# Patient Record
Sex: FEMALE | Race: WHITE | NOT HISPANIC OR LATINO | URBAN - METROPOLITAN AREA
[De-identification: names, ages, dates, MRNs, and addresses within clinical notes are randomized per-mention and may not be internally consistent; named-entity substitution may affect disease eponyms.]

---

## 2017-08-21 ENCOUNTER — COMMUNICATION - HEALTHEAST (OUTPATIENT)
Dept: INTERNAL MEDICINE | Facility: CLINIC | Age: 42
End: 2017-08-21

## 2017-08-28 ENCOUNTER — OFFICE VISIT - HEALTHEAST (OUTPATIENT)
Dept: INTERNAL MEDICINE | Facility: CLINIC | Age: 42
End: 2017-08-28

## 2017-08-28 DIAGNOSIS — Z79.899 ENCOUNTER FOR LONG-TERM (CURRENT) USE OF OTHER MEDICATIONS: ICD-10-CM

## 2017-08-28 DIAGNOSIS — D64.9 ANEMIA: ICD-10-CM

## 2017-08-28 DIAGNOSIS — R87.619 ABNORMAL PAP SMEAR OF CERVIX: ICD-10-CM

## 2017-08-28 DIAGNOSIS — H65.90 SEROUS OTITIS MEDIA: ICD-10-CM

## 2017-08-28 DIAGNOSIS — Z13.220 LIPID SCREENING: ICD-10-CM

## 2017-08-28 DIAGNOSIS — I10 ESSENTIAL HYPERTENSION: ICD-10-CM

## 2017-08-28 LAB
CHOLEST SERPL-MCNC: 163 MG/DL
FASTING STATUS PATIENT QL REPORTED: NO
HDLC SERPL-MCNC: 24 MG/DL
LDLC SERPL CALC-MCNC: 60 MG/DL
LDLC SERPL CALC-MCNC: ABNORMAL MG/DL
TRIGL SERPL-MCNC: 425 MG/DL

## 2017-08-28 ASSESSMENT — MIFFLIN-ST. JEOR: SCORE: 1378.64

## 2017-08-29 ENCOUNTER — COMMUNICATION - HEALTHEAST (OUTPATIENT)
Dept: INTERNAL MEDICINE | Facility: CLINIC | Age: 42
End: 2017-08-29

## 2017-08-29 DIAGNOSIS — D50.9 IRON DEFICIENCY ANEMIA: ICD-10-CM

## 2018-05-09 ENCOUNTER — COMMUNICATION - HEALTHEAST (OUTPATIENT)
Dept: INTERNAL MEDICINE | Facility: CLINIC | Age: 43
End: 2018-05-09

## 2018-11-12 ENCOUNTER — COMMUNICATION - HEALTHEAST (OUTPATIENT)
Dept: INTERNAL MEDICINE | Facility: CLINIC | Age: 43
End: 2018-11-12

## 2018-11-15 ENCOUNTER — OFFICE VISIT - HEALTHEAST (OUTPATIENT)
Dept: INTERNAL MEDICINE | Facility: CLINIC | Age: 43
End: 2018-11-15

## 2018-11-15 DIAGNOSIS — I10 BENIGN ESSENTIAL HYPERTENSION: ICD-10-CM

## 2018-11-15 DIAGNOSIS — D50.8 IRON DEFICIENCY ANEMIA SECONDARY TO INADEQUATE DIETARY IRON INTAKE: ICD-10-CM

## 2018-11-15 RX ORDER — METOPROLOL SUCCINATE 100 MG/1
100 TABLET, EXTENDED RELEASE ORAL DAILY
Qty: 90 TABLET | Refills: 3 | Status: SHIPPED | OUTPATIENT
Start: 2018-11-15 | End: 2022-03-30 | Stop reason: DRUGHIGH

## 2018-11-15 RX ORDER — FERROUS SULFATE 325(65) MG
1 TABLET ORAL 2 TIMES DAILY
Qty: 60 TABLET | Refills: 3 | Status: SHIPPED | OUTPATIENT
Start: 2018-11-15

## 2018-11-15 ASSESSMENT — MIFFLIN-ST. JEOR: SCORE: 1368.2

## 2020-04-15 ENCOUNTER — COMMUNICATION - HEALTHEAST (OUTPATIENT)
Dept: SCHEDULING | Facility: CLINIC | Age: 45
End: 2020-04-15

## 2020-04-15 ENCOUNTER — OFFICE VISIT - HEALTHEAST (OUTPATIENT)
Dept: INTERNAL MEDICINE | Facility: CLINIC | Age: 45
End: 2020-04-15

## 2020-04-15 DIAGNOSIS — I10 BENIGN ESSENTIAL HYPERTENSION: ICD-10-CM

## 2020-04-15 RX ORDER — METOPROLOL SUCCINATE 100 MG/1
100 TABLET, EXTENDED RELEASE ORAL DAILY
Qty: 30 TABLET | Refills: 11 | Status: SHIPPED | OUTPATIENT
Start: 2020-04-15 | End: 2022-03-30

## 2021-05-27 VITALS — DIASTOLIC BLOOD PRESSURE: 92 MMHG | SYSTOLIC BLOOD PRESSURE: 160 MMHG

## 2021-05-31 VITALS — HEIGHT: 66 IN | WEIGHT: 162 LBS | BODY MASS INDEX: 26.03 KG/M2

## 2021-06-02 VITALS — WEIGHT: 159.7 LBS | BODY MASS INDEX: 25.67 KG/M2 | HEIGHT: 66 IN

## 2021-06-07 NOTE — TELEPHONE ENCOUNTER
RN triage   Call from pt   Pt states saw CHIRO today -- BP = 160/92   Pt states she lost her health insurance and had not taken her BP med for about 1 year   Pt states she now has health insurance   No chest pain - no diff breathing - no slurred speech or blurred vision - no gait changes   Pt states she gets bad headache about 3 times / week -- and ' sees stars' sometimes w/ bad headaches - and sl lightheaded -   Has mild headache now   Usually takes Advil for headaches   No PCP of record  reviewed home care advice   Transferred to  for telephone appt     Zully Lucero RN [facility] RN triage          Reason for Disposition    Systolic BP >= 180 OR Diastolic >= 110, and missed most recent dose of blood pressure medication    Protocols used: HIGH BLOOD PRESSURE-A-[place]

## 2021-06-07 NOTE — PROGRESS NOTES
"Zeny Mantilla is a 45 y.o. female who is being evaluated via a billable telephone visit.      The patient has been notified of following:     \"This telephone visit will be conducted via a call between you and your physician/provider. We have found that certain health care needs can be provided without the need for a physical exam.  This service lets us provide the care you need with a short phone conversation.  If a prescription is necessary we can send it directly to your pharmacy.  If lab work is needed we can place an order for that and you can then stop by our lab to have the test done at a later time.    Telephone visits are billed at different rates depending on your insurance coverage. During this emergency period, for some insurers they may be billed the same as an in-person visit.  Please reach out to your insurance provider with any questions.    If during the course of the call the physician/provider feels a telephone visit is not appropriate, you will not be charged for this service.\"    Patient has given verbal consent to a Telephone visit? Yes    Patient would like to receive their AVS by AVS Preference: Christa.    Additional provider notes: 45-year-old woman with a history of hypertension.  She quit taking her medications after her insurance ran out no meds.  She is now back with insurance and her blood pressure is 162/92.  She would like to restart back on her medications.  Previously, she took metoprolol 100 mg daily without any side effects.  No diabetes and she is a non-smoker.    Assessment/Plan:  Hypertension currently not being treated.  This is because of lack of insurance.  She now has insurance so we will restart her on metoprolol succinate 100 mg p.o. daily.      Phone call duration:  5 minutes    Geno Crain CMA    "

## 2021-06-12 NOTE — PROGRESS NOTES
[facility] Follow Up Note  Assessment/Plan  1. Essential hypertension     2. Abnormal Pap smear of cervix     3. Lipid screening  Lipid Cascade   4. Encounter for long-term (current) use of other medications  Comprehensive Metabolic Panel    HM2(CBC w/o Differential)   5. Serous otitis media       6.  Anemia -    Patient Instructions   1. Medications were reviewed and medication refills completed if requested.   A corrected Medication List is listed below on this After Visit    Summary.   New Medications, Refilled medications or Discontinued medications are also listed below.      2. Immunizations were reviewed and updated as necessary.   All up to date  3. If labs were done today, they will either be mailed to you or released to My Chart online if that has been activated.  4. Increase metoprolol to 100 mg daily  5. We discussed today that the patient will need to get a new primary care provider after September 1, 2017.  Dr. Elver Carpio  6.  Anemia -  after the patient left her hemoglobin came back low and so we will do iron studies.  Presumably she has a risk of iron deficiency anemia due to menstrual blood loss.         MORE THAN 25 MINUTES WAS SPENT WITH THE PATIENT TODAY OF WHICH GREATER THAN 50% WAS SPENT IN COUNSELING AND COORDINATION OF CARE -  DISCUSSING THE AFOREMENTIONED DIAGNOSES, TREATMENT, AND PLAN.       Body mass index is 26.55 kg/(m^2).    Shantanu Perez MD  Internal Medicine [place] [address]  Voice: [phone]  Fax: [phone]    +++++++++++++++++++++++++++++++++++++++    Zeny Mantilla   42 y.o. female    Date of Visit: 8/28/2017    Chief Complaint   Patient presents with     Hypertension     Medication Management     Ear Problem     ,left     Subjective  1. Health Maintenance  - basic immunizations are up-to-date.  Pap smear is done elsewhere and is abnormal and is being followed.  I  believe that she had a colposcopy because of the abnormal Pap smear.    2.  Hypertension -patient  blood pressure medication today.  She gotten the metoprolol 25 mg twice daily filled by her gynecologist.  Blood pressure is substantially elevated today and so I do not think that is the correct dose.  I have not seen her for well over 2 years.  She is encouraged to follow-up for blood pressure as not only she might require a higher dose metoprolol but she might require multi drug therapy.  She drinks caffeine but does not drink alcohol and does not use street drugs.  She does not think that she is significantly stressed out at this time as she was when I met her in 2015 per    3.  Plugged left ear -patient is not acutely ill with respiratory infection but has had a plugged left ear on and off for 6 weeks.  In the past she had a history of plugged ear that required ear lavage but in fact he does not have evidence of cerumen impaction.  No fever or chills.  No history of allergies.    4.  Anemia -this diagnosis was made after she left based on basic laboratory studies that I ordered.  Going to try to add on iron studies and will need to determine if she needs iron supplements.    5.  Adjustment disorder with depression and anxiety -patient currently is not employed but she has her own home.  See my note of March 26, 2015 which references countless psychosocial issues that she had most of which are stable are under control now.    6.  Abnormal Pap smear -I emphasized extremely important that she continue to follow up with this with her gynecologist.  Her most recent visit was April 21, 2017 and I review those outside labs.      ROS A comprehensive review of systems was performed and was negative other than the problems noted above.    Medications, allergies, and problem list were reviewed and updated    No past medical history on file.  Past Surgical History:   Procedure Laterality Date     LAPAROSCOPIC ASSISSTED  TOTAL COLECTOMY W/ J-POUCH  1995     Social History     Social History     Marital status:      Spouse name: N/A     Number of children: N/A     Years of education: N/A     Occupational History     UNEMPLOYED      Social History Main Topics     Smoking status: Current Every Day Smoker     Packs/day: 1.00     Start date: 1/26/1992     Smokeless tobacco: Not on file     Alcohol use No     Drug use: No     Sexual activity: Not on file     Other Topics Concern     Not on file     Social History Narrative     No family history on file.    Current Outpatient Prescriptions   Medication Sig Dispense Refill     azithromycin (ZITHROMAX Z-JORDAN) 250 MG tablet Take 2 tablets (500 mg) on  Day 1,  followed by 1 tablet (250 mg) once daily on Days 2 through 5. 6 tablet 0     metoprolol succinate (TOPROL-XL) 100 MG 24 hr tablet Take 1 tablet (100 mg total) by mouth daily. 30 tablet 5     No current facility-administered medications for this visit.        Allergies   Allergen Reactions     Penicillins Anaphylaxis     Sulfa (Sulfonamide Antibiotics) Anaphylaxis     Codeine Other (See Comments)     Difficulty breathing      Morphine Other (See Comments)     Difficulty breathing      Percocet [Oxycodone-Acetaminophen] Other (See Comments)     Difficulty breathing      Vicodin [Hydrocodone-Acetaminophen] Other (See Comments)     Difficulty breathing        Exam  Vitals:    08/28/17 1332   BP: 150/90   Pulse: 84   Resp: 12     The patient is well-groomed and well-dressed alert and oriented ×3.   Head: Negative  HEENT: Normal external ear canals with no excess wax present.  Tympanic membranes are clear except the left tympanic membrane may be slightly dull but certainly no redness or bulging.  Lungs: Clear to auscultation without rales or wheezing.    Extremities: No peripheral edema.  Pulses are normal and symmetrical.  Neuro: Gait and mentation normal. Cranial Nerves intact.            Shantanu Perez MD

## 2021-06-16 PROBLEM — D50.9 IRON DEFICIENCY ANEMIA: Status: ACTIVE | Noted: 2017-08-29

## 2021-06-21 NOTE — PROGRESS NOTES
" ASSESSMENT AND PLAN:    1. Benign essential hypertension  Adequately controlled.     2. Iron deficiency anemia secondary to inadequate dietary iron intake  She has been taking iron replacement and will continue.  Doesn't want cbc or iron levels checked today.  Iron replacement gives her constipation.  Will use senakot two times a day.  Discussed fiber and adequate fluid intake.      CHIEF COMPLAINT:  Chief Complaint   Patient presents with     Follow-up     hypertension     Medication Management     refill     HISTORY OF PRESENT ILLNESS:  Zeny Mantilla is a 43 y.o. female her for medication refill and management.  Says she has quit smoking.  Overall doing well.  History of menorrhagia and iron deficiency.  Using an OTC iron supplement.  Gets constipation from iron.  No unsual dyspnea, or pain.      REVIEW OF SYSTEMS:   See HPI, all other systems on review are negative.    Active Ambulatory Problems     Diagnosis Date Noted     Iron deficiency anemia 08/29/2017     Resolved Ambulatory Problems     Diagnosis Date Noted     No Resolved Ambulatory Problems     No Additional Past Medical History     Past Surgical History:   Procedure Laterality Date     LAPAROSCOPIC ASSISSTED TOTAL COLECTOMY W/ J-POUCH  1995     VITALS:  Vitals:    11/15/18 1205 11/15/18 1212   BP: 140/84 140/86   Patient Site: Left Arm Left Arm   Patient Position: Sitting Sitting   Cuff Size: Adult Regular Adult Regular   Pulse: 87    SpO2: 98%    Weight: 159 lb 11.2 oz (72.4 kg)    Height: 5' 5.5\" (1.664 m)      Wt Readings from Last 3 Encounters:   11/15/18 159 lb 11.2 oz (72.4 kg)   08/28/17 162 lb (73.5 kg)   03/26/15 175 lb (79.4 kg)     PHYSICAL EXAM:  Constitutional:  Well appearing in NAD, alert and oriented  Neck:  Supple, no significant adenopathy.  Cardiac:  S1 S2 without murmur, rhythm regular  Lungs: Clear to auscultation, without wheezes or rales  Abdomen:   Soft, flat and non-tender, without guarding, rebound, or mass.      Current " Outpatient Medications   Medication Sig Dispense Refill     metoprolol succinate (TOPROL-XL) 100 MG 24 hr tablet Take 1 tablet (100 mg total) by mouth daily. 90 tablet 3     ferrous sulfate 325 (65 FE) MG tablet Take 1 tablet (325 mg total) by mouth 2 (two) times a day. 60 tablet 3     senna (SENOKOT) 8.6 mg tablet Take 1 tablet by mouth 2 (two) times a day. 60 tablet 6     No current facility-administered medications for this visit.      Neeraj Rivera MD  Internal Medicine  [facility]

## 2021-06-22 ENCOUNTER — COMMUNICATION - HEALTHEAST (OUTPATIENT)
Dept: INTERNAL MEDICINE | Facility: CLINIC | Age: 46
End: 2021-06-22

## 2021-06-22 DIAGNOSIS — I10 BENIGN ESSENTIAL HYPERTENSION: ICD-10-CM

## 2021-06-22 RX ORDER — METOPROLOL SUCCINATE 100 MG/1
100 TABLET, EXTENDED RELEASE ORAL DAILY
Qty: 30 TABLET | Refills: 1 | Status: SHIPPED | OUTPATIENT
Start: 2021-06-22 | End: 2022-03-30

## 2022-01-13 DIAGNOSIS — I10 BENIGN ESSENTIAL HYPERTENSION: ICD-10-CM

## 2022-01-16 NOTE — TELEPHONE ENCOUNTER
"Former patient of ??? & has not established care with another provider.  Please assign refill request to covering provider per clinic standard process.    Routing refill request to provider for review/approval because:  BP not current  Patient needs to be seen because it has been more than 1 year since last office visit.  No PCP    Last Written Prescription Date:  10/28/21  Last Fill Quantity: 30,  # refills: 0   Last office visit provider:  4/15/20     Requested Prescriptions   Pending Prescriptions Disp Refills     metoprolol succinate ER (TOPROL-XL) 100 MG 24 hr tablet [Pharmacy Med Name: METOPROLOL ER SUCCINATE 100MG TABS] 30 tablet 0     Sig: TAKE 1 TABLET(100 MG) BY MOUTH DAILY       Beta-Blockers Protocol Failed - 1/13/2022  3:16 AM        Failed - Blood pressure under 140/90 in past 12 months     BP Readings from Last 3 Encounters:   04/15/20 (!) 160/92                 Failed - Recent (12 mo) or future (30 days) visit within the authorizing provider's specialty     Patient has had an office visit with the authorizing provider or a provider within the authorizing providers department within the previous 12 mos or has a future within next 30 days. See \"Patient Info\" tab in inbasket, or \"Choose Columns\" in Meds & Orders section of the refill encounter.              Passed - Patient is age 6 or older        Passed - Medication is active on med list             Chase Lau RN 01/16/22 9:17 AM  "

## 2022-01-17 RX ORDER — METOPROLOL SUCCINATE 100 MG/1
TABLET, EXTENDED RELEASE ORAL
Qty: 30 TABLET | Refills: 0 | Status: SHIPPED | OUTPATIENT
Start: 2022-01-17 | End: 2022-02-24

## 2022-02-23 DIAGNOSIS — I10 BENIGN ESSENTIAL HYPERTENSION: ICD-10-CM

## 2022-02-23 NOTE — TELEPHONE ENCOUNTER
"Routing refill request to provider for review/approval because:  Labs not current:  Blood pressure  Patient needs to be seen because it has been more than 1 year since last office visit.    Last Written Prescription Date:  1/17/2022  Last Fill Quantity: 30,  # refills: 0   Last office visit provider:  4/15/2020     Pt has an appt with a provider to establish care on 3/30/2022.     Requested Prescriptions   Pending Prescriptions Disp Refills     metoprolol succinate ER (TOPROL-XL) 100 MG 24 hr tablet [Pharmacy Med Name: METOPROLOL ER SUCCINATE 100MG TABS] 30 tablet 0     Sig: TAKE 1 TABLET(100 MG) BY MOUTH DAILY       Beta-Blockers Protocol Failed - 2/23/2022  3:16 AM        Failed - Blood pressure under 140/90 in past 12 months     BP Readings from Last 3 Encounters:   04/15/20 (!) 160/92                 Failed - Recent (12 mo) or future (30 days) visit within the authorizing provider's specialty     Patient has had an office visit with the authorizing provider or a provider within the authorizing providers department within the previous 12 mos or has a future within next 30 days. See \"Patient Info\" tab in inbasket, or \"Choose Columns\" in Meds & Orders section of the refill encounter.              Passed - Patient is age 6 or older        Passed - Medication is active on med list             Radha Swann RN 02/23/22 12:37 PM  "

## 2022-02-24 RX ORDER — METOPROLOL SUCCINATE 100 MG/1
TABLET, EXTENDED RELEASE ORAL
Qty: 40 TABLET | Refills: 0 | Status: SHIPPED | OUTPATIENT
Start: 2022-02-24 | End: 2022-03-30

## 2022-02-24 NOTE — TELEPHONE ENCOUNTER
Former pt of Dr. Summers.  Pt has an establish care visit 3/30/2022 at 12:30 PM with Dr. Doherty.  40 day supply pended to bridge until appointment.

## 2022-03-30 ENCOUNTER — OFFICE VISIT (OUTPATIENT)
Dept: FAMILY MEDICINE | Facility: CLINIC | Age: 47
End: 2022-03-30
Payer: MEDICARE

## 2022-03-30 VITALS
RESPIRATION RATE: 18 BRPM | OXYGEN SATURATION: 99 % | SYSTOLIC BLOOD PRESSURE: 130 MMHG | HEART RATE: 90 BPM | HEIGHT: 65 IN | DIASTOLIC BLOOD PRESSURE: 90 MMHG | WEIGHT: 172 LBS | BODY MASS INDEX: 28.66 KG/M2 | TEMPERATURE: 98.8 F

## 2022-03-30 DIAGNOSIS — Z12.11 SCREEN FOR COLON CANCER: ICD-10-CM

## 2022-03-30 DIAGNOSIS — M62.89 HAMSTRING TIGHTNESS: ICD-10-CM

## 2022-03-30 DIAGNOSIS — Z12.4 CERVICAL CANCER SCREENING: ICD-10-CM

## 2022-03-30 DIAGNOSIS — K59.00 CONSTIPATION, UNSPECIFIED CONSTIPATION TYPE: ICD-10-CM

## 2022-03-30 DIAGNOSIS — S69.91XD INJURY OF RIGHT INDEX FINGER, SUBSEQUENT ENCOUNTER: ICD-10-CM

## 2022-03-30 DIAGNOSIS — Z80.0 FAMILY HISTORY OF COLON CANCER: ICD-10-CM

## 2022-03-30 DIAGNOSIS — Z00.00 ENCOUNTER FOR MEDICARE ANNUAL WELLNESS EXAM: Primary | ICD-10-CM

## 2022-03-30 DIAGNOSIS — I10 BENIGN ESSENTIAL HYPERTENSION: ICD-10-CM

## 2022-03-30 LAB
ALBUMIN SERPL-MCNC: 3.9 G/DL (ref 3.5–5)
ALP SERPL-CCNC: 136 U/L (ref 45–120)
ALT SERPL W P-5'-P-CCNC: 15 U/L (ref 0–45)
ANION GAP SERPL CALCULATED.3IONS-SCNC: 9 MMOL/L (ref 5–18)
AST SERPL W P-5'-P-CCNC: 19 U/L (ref 0–40)
BILIRUB SERPL-MCNC: 0.7 MG/DL (ref 0–1)
BUN SERPL-MCNC: 8 MG/DL (ref 8–22)
CALCIUM SERPL-MCNC: 10 MG/DL (ref 8.5–10.5)
CHLORIDE BLD-SCNC: 105 MMOL/L (ref 98–107)
CHOLEST SERPL-MCNC: 188 MG/DL
CO2 SERPL-SCNC: 26 MMOL/L (ref 22–31)
CREAT SERPL-MCNC: 0.7 MG/DL (ref 0.6–1.1)
FASTING STATUS PATIENT QL REPORTED: YES
GFR SERPL CREATININE-BSD FRML MDRD: >90 ML/MIN/1.73M2
GLUCOSE BLD-MCNC: 86 MG/DL (ref 70–125)
HDLC SERPL-MCNC: 34 MG/DL
LDLC SERPL CALC-MCNC: 120 MG/DL
MAGNESIUM SERPL-MCNC: 2.3 MG/DL (ref 1.8–2.6)
POTASSIUM BLD-SCNC: 4.7 MMOL/L (ref 3.5–5)
PROT SERPL-MCNC: 7 G/DL (ref 6–8)
SODIUM SERPL-SCNC: 140 MMOL/L (ref 136–145)
TRIGL SERPL-MCNC: 171 MG/DL

## 2022-03-30 PROCEDURE — 80053 COMPREHEN METABOLIC PANEL: CPT | Performed by: FAMILY MEDICINE

## 2022-03-30 PROCEDURE — 36415 COLL VENOUS BLD VENIPUNCTURE: CPT | Performed by: FAMILY MEDICINE

## 2022-03-30 PROCEDURE — 99215 OFFICE O/P EST HI 40 MIN: CPT | Mod: 25 | Performed by: FAMILY MEDICINE

## 2022-03-30 PROCEDURE — G0438 PPPS, INITIAL VISIT: HCPCS | Performed by: FAMILY MEDICINE

## 2022-03-30 PROCEDURE — 83735 ASSAY OF MAGNESIUM: CPT | Performed by: FAMILY MEDICINE

## 2022-03-30 PROCEDURE — 80061 LIPID PANEL: CPT | Performed by: FAMILY MEDICINE

## 2022-03-30 RX ORDER — CLOTRIMAZOLE 1 %
CREAM (GRAM) TOPICAL
COMMUNITY
Start: 2021-08-31

## 2022-03-30 RX ORDER — METOPROLOL SUCCINATE 100 MG/1
100 TABLET, EXTENDED RELEASE ORAL DAILY
Qty: 90 TABLET | Refills: 1 | Status: SHIPPED | OUTPATIENT
Start: 2022-03-30 | End: 2023-02-27

## 2022-03-30 RX ORDER — POLYETHYLENE GLYCOL 3350 17 G/17G
17 POWDER, FOR SOLUTION ORAL
COMMUNITY
Start: 2022-01-18

## 2022-03-30 RX ORDER — DOCUSATE SODIUM 100 MG/1
100 CAPSULE, LIQUID FILLED ORAL DAILY PRN
Qty: 180 CAPSULE | Refills: 1 | Status: SHIPPED | OUTPATIENT
Start: 2022-03-30 | End: 2022-09-26

## 2022-03-30 RX ORDER — DOCUSATE SODIUM 100 MG/1
CAPSULE, LIQUID FILLED ORAL
COMMUNITY
Start: 2021-08-31 | End: 2022-03-30

## 2022-03-30 ASSESSMENT — PATIENT HEALTH QUESTIONNAIRE - PHQ9
SUM OF ALL RESPONSES TO PHQ QUESTIONS 1-9: 15
10. IF YOU CHECKED OFF ANY PROBLEMS, HOW DIFFICULT HAVE THESE PROBLEMS MADE IT FOR YOU TO DO YOUR WORK, TAKE CARE OF THINGS AT HOME, OR GET ALONG WITH OTHER PEOPLE: SOMEWHAT DIFFICULT
SUM OF ALL RESPONSES TO PHQ QUESTIONS 1-9: 15

## 2022-03-30 ASSESSMENT — ACTIVITIES OF DAILY LIVING (ADL): CURRENT_FUNCTION: NO ASSISTANCE NEEDED

## 2022-03-30 NOTE — LETTER
April 5, 2022      Zeny YOLY Mantilla  [address] [place] [address]        Dear ,    We are writing to inform you of your test results.    Normal   Other Lab values marked (H) or (L) are not clinically/medically significant       Resulted Orders   XR Lumbar Spine 2/3 Views    Narrative    EXAM DATE:         03/30/2022    EXAM: X-RAY LUMBAR SPINE, AP AND LATERAL  LOCATION: [facility]  DATE/TIME: 3/30/2022 1:15 PM    INDICATION: Low back pain radiating down right leg  COMPARISON: None.  TECHNIQUE: CR Lumbar Spine.    IMPRESSION: 5 lumbar type vertebrae. Mild levocurvature of the lumbar spine. Vertebral body heights are maintained. No pars defects. Mild facet arthropathy at L5-S1. The visualized bony pelvis is grossly within normal limits.    Surgical clips project over the right upper quadrant.             Comprehensive metabolic panel (BMP + Alb, Alk Phos, ALT, AST, Total. Bili, TP)   Result Value Ref Range    Sodium 140 136 - 145 mmol/L    Potassium 4.7 3.5 - 5.0 mmol/L    Chloride 105 98 - 107 mmol/L    Carbon Dioxide (CO2) 26 22 - 31 mmol/L    Anion Gap 9 5 - 18 mmol/L    Urea Nitrogen 8 8 - 22 mg/dL    Creatinine 0.70 0.60 - 1.10 mg/dL    Calcium 10.0 8.5 - 10.5 mg/dL    Glucose 86 70 - 125 mg/dL    Alkaline Phosphatase 136 (H) 45 - 120 U/L    AST 19 0 - 40 U/L    ALT 15 0 - 45 U/L    Protein Total 7.0 6.0 - 8.0 g/dL    Albumin 3.9 3.5 - 5.0 g/dL    Bilirubin Total 0.7 0.0 - 1.0 mg/dL    GFR Estimate >90 >60 mL/min/1.73m2      Comment:      Effective December 21, 2021 eGFRcr in adults is calculated using the 2021 CKD-EPI creatinine equation which includes age and gender (John Paul et al., NEJM, DOI: 10.1056/YQSScj8154035)   Lipid panel reflex to direct LDL Fasting   Result Value Ref Range    Cholesterol 188 <=199 mg/dL    Triglycerides 171 (H) <=149 mg/dL    Direct Measure HDL 34 (L) >=50 mg/dL      Comment:      HDL Cholesterol Reference Range:     0-2 years:   No  reference ranges established for patients under 2 years old  at [facility] [employer] for lipid analytes.    2-8 years:  Greater than 45 mg/dL     18 years and older:   Female: Greater than or equal to 50 mg/dL   Male:   Greater than or equal to 40 mg/dL    LDL Cholesterol Calculated 120 <=129 mg/dL    Patient Fasting > 8hrs? Yes    Magnesium   Result Value Ref Range    Magnesium 2.3 1.8 - 2.6 mg/dL       If you have any questions or concerns, please call the clinic at the number listed above.       Sincerely,      Philipp Doherty MD

## 2022-03-30 NOTE — LETTER
March 31, 2022      Zeny Mantilla  [address] [place] [address]        Dear MsCeasar,    We are writing to inform you of your test results.    Normal X-ray findings     Consider Physical Therapy for hamstring pain       Resulted Orders   XR Lumbar Spine 2/3 Views    Narrative    EXAM DATE:         03/30/2022    EXAM: X-RAY LUMBAR SPINE, AP AND LATERAL  LOCATION: [facility]  DATE/TIME: 3/30/2022 1:15 PM    INDICATION: Low back pain radiating down right leg  COMPARISON: None.  TECHNIQUE: CR Lumbar Spine.    IMPRESSION: 5 lumbar type vertebrae. Mild levocurvature of the lumbar spine. Vertebral body heights are maintained. No pars defects. Mild facet arthropathy at L5-S1. The visualized bony pelvis is grossly within normal limits.    Surgical clips project over the right upper quadrant.                 If you have any questions or concerns, please call the clinic at the number listed above.       Sincerely,      Philipp Doherty MD

## 2022-03-30 NOTE — PATIENT INSTRUCTIONS
Patient Education   Personalized Prevention Plan  You are due for the preventive services outlined below.  Your care team is available to assist you in scheduling these services.  If you have already completed any of these items, please share that information with your care team to update in your medical record.  Health Maintenance Due   Topic Date Due     ANNUAL REVIEW OF HM ORDERS  Never done     COVID-19 Vaccine (1) Never done     Colorectal Cancer Screening  Never done     Flu Vaccine (1) 09/01/2021       Exercise for a Healthier Heart  You may wonder how you can improve the health of your heart. If you re thinking about exercise, you re on the right track. You don t need to become an athlete. But you do need a certain amount of brisk exercise to help strengthen your heart. If you have been diagnosed with a heart condition, your healthcare provider may advise exercise to help stabilize your condition. To help make exercise a habit, choose safe, fun activities.      Exercise with a friend. When activity is fun, you're more likely to stick with it.   Before you start  Check with your healthcare provider before starting an exercise program. This is especially important if you have not been active for a while. It's also important if you have a long-term (chronic) health problem such as heart disease, diabetes, or obesity. Or if you are at high risk for having these problems.   Why exercise?  Exercising regularly offers many healthy rewards. It can help you do all of the following:     Improve your blood cholesterol level to help prevent further heart trouble    Lower your blood pressure to help prevent a stroke or heart attack    Control diabetes, or reduce your risk of getting this disease    Improve your heart and lung function    Reach and stay at a healthy weight    Make your muscles stronger so you can stay active    Prevent falls and fractures by slowing the loss of bone mass (osteoporosis)    Manage stress  better    Reduce your blood pressure    Improve your sense of self and your body image  Exercise tips      Ease into your routine. Set small goals. Then build on them. If you are not sure what your activity level should be, talk with your healthcare provider first before starting an exercise routine.    Exercise on most days. Aim for a total of 150 minutes (2 hours and 30 minutes) or more of moderate-intensity aerobic activity each week. Or 75 minutes (1 hour and 15 minutes) or more of vigorous-intensity aerobic activity each week. Or try for a combination of both. Moderate activity means that you breathe heavier and your heart rate increases but you can still talk. Think about doing 40 minutes of moderate exercise, 3 to 4 times a week. For best results, activity should last for about 40 minutes to lower blood pressure and cholesterol. It's OK to work up to the 40-minute period over time. Examples of moderate-intensity activity are walking 1 mile in 15 minutes. Or doing 30 to 45 minutes of yard work.    Step up your daily activity level.  Along with your exercise program, try being more active the whole day. Walk instead of drive. Or park further away so that you take more steps each day. Do more household tasks or yard work. You may not be able to meet the advised mount of physical activity. But doing some moderate- or vigorous-intensity aerobic activity can help reduce your risk for heart disease. Your healthcare provider can help you figure out what is best for you.    Choose 1 or more activities you enjoy.  Walking is one of the easiest things you can do. You can also try swimming, riding a bike, dancing, or taking an exercise class.    When to call your healthcare provider  Call your healthcare provider if you have any of these:     Chest pain or feel dizzy or lightheaded    Burning, tightness, pressure, or heaviness in your chest, neck, shoulders, back, or arms    Abnormal shortness of breath    More joint or  muscle pain    A very fast or irregular heartbeat (palpitations)  [employer] last reviewed this educational content on 7/1/2019 2000-2021 [employer]. All rights reserved. This information is not intended as a substitute for professional medical care. Always follow your healthcare professional's instructions.          Understanding USDA MyPlate  The USDA has guidelines to help you make healthy food choices. These are called MyPlate. MyPlate shows the food groups that make up healthy meals using the image of a place setting. Before you eat, think about the healthiest choices for what to put on your plate or in your cup or bowl. To learn more about building a healthy plate, visit www.choosemyplate.gov.    The food groups    Fruits. Any fruit or 100% fruit juice counts as part of the Fruit Group. Fruits may be fresh, canned, frozen, or dried, and may be whole, cut-up, or pureed. Make 1/2 of your plate fruits and vegetables.    Vegetables. Any vegetable or 100% vegetable juice counts as a member of the Vegetable Group. Vegetables may be fresh, frozen, canned, or dried. They can be served raw or cooked and may be whole, cut-up, or mashed. Make 1/2 of your plate fruits and vegetables.    Grains. All foods made from grains are part of the Grains Group. These include wheat, rice, oats, cornmeal, and barley. Grains are often used to make foods such as bread, pasta, oatmeal, cereal, tortillas, and grits. Grains should be no more than 1/4 of your plate. At least half of your grains should be whole grains.    Protein. This group includes meat, poultry, seafood, beans and peas, eggs, processed soy products (such as tofu), nuts (including nut butters), and seeds. Make protein choices no more than 1/4 of your plate. Meat and poultry choices should be lean or low fat.    Dairy. The Dairy Group includes all fluid milk products and foods made from milk that contain calcium, such as yogurt and cheese. (Foods that have  little calcium, such as cream, butter, and cream cheese, are not part of this group.) Most dairy choices should be low-fat or fat-free.    Oils. Oils aren't a food group, but they do contain essential nutrients. However it's important to watch your intake of oils. These are fats that are liquid at room temperature. They include canola, corn, olive, soybean, vegetable, and sunflower oil. Foods that are mainly oil include mayonnaise, certain salad dressings, and soft margarines. You likely already get your daily oil allowance from the foods you eat.  Things to limit  Eating healthy also means limiting these things in your diet:       Salt (sodium). Many processed foods have a lot of sodium. To keep sodium intake down, eat fresh vegetables, meats, poultry, and seafood when possible. Purchase low-sodium, reduced-sodium, or no-salt-added food products at the store. And don't add salt to your meals at home. Instead, season them with herbs and spices such as dill, oregano, cumin, and paprika. Or try adding flavor with lemon or lime zest and juice.    Saturated fat. Saturated fats are most often found in animal products such as beef, pork, and chicken. They are often solid at room temperature, such as butter. To reduce your saturated fat intake, choose leaner cuts of meat and poultry. And try healthier cooking methods such as grilling, broiling, roasting, or baking. For a simple lower-fat swap, use plain nonfat yogurt instead of mayonnaise when making potato salad or macaroni salad.    Added sugars. These are sugars added to foods. They are in foods such as ice cream, candy, soda, fruit drinks, sports drinks, energy drinks, cookies, pastries, jams, and syrups. Cut down on added sugars by sharing sweet treats with a family member or friend. You can also choose fruit for dessert, and drink water or other unsweetened beverages.     StayWell last reviewed this educational content on 6/1/2020 2000-2021 [employer]. All rights reserved. This information is not intended as a substitute for professional medical care. Always follow your healthcare professional's instructions.          Signs of Hearing Loss      Hearing much better with one ear can be a sign of hearing loss.   Hearing loss is a problem shared by many people. In fact, it is one of the most common health problems, particularly as people age. Most people age 65 and older have some hearing loss. By age 80, almost everyone does. Hearing loss often occurs slowly over the years. So you may not realize your hearing has gotten worse.  Have your hearing checked  Call your healthcare provider if you:    Have to strain to hear normal conversation    Have to watch other people s faces very carefully to follow what they re saying    Need to ask people to repeat what they ve said    Often misunderstand what people are saying    Turn the volume of the television or radio up so high that others complain    Feel that people are mumbling when they re talking to you    Find that the effort to hear leaves you feeling tired and irritated    Notice, when using the phone, that you hear better with one ear than the other  [employer] last reviewed this educational content on 1/1/2020 2000-2021 [employer]. All rights reserved. This information is not intended as a substitute for professional medical care. Always follow your healthcare professional's instructions.          Urinary Incontinence, Female (Adult)   Urinary incontinence means loss of bladder control. This problem affects many women, especially as they get older. If you have incontinence, you may be embarrassed to ask for help. But know that this problem can be treated.   Types of Incontinence  There are different types of incontinence. Two of the main types are described here. You can have more than one type.     Stress incontinence. With this type, urine leaks when pressure (stress) is put on the  bladder. This may happen when you cough, sneeze, or laugh. Stress incontinence most often occurs because the pelvic floor muscles that support the bladder and urethra are weak. This can happen after pregnancy and vaginal childbirth or a hysterectomy. It can also be due to excess body weight or hormone changes.    Urge incontinence (also called overactive bladder). With this type, a sudden urge to urinate is felt often. This may happen even though there may not be much urine in the bladder. The need to urinate often during the night is common. Urge incontinence most often occurs because of bladder spasms. This may be due to bladder irritation or infection. Damage to bladder nerves or pelvic muscles, constipation, and certain medicines can also lead to urge incontinence.  Treatment depends on the cause. Further evaluation is needed to find the type you have. This will likely include an exam and certain tests. Based on the results, you and your healthcare provider can then plan treatment. Until a diagnosis is made, the home care tips below can help ease symptoms.   Home care    Do pelvic floor muscle exercises, if they are prescribed. The pelvic floor muscles help support the bladder and urethra. Many women find that their symptoms improve when doing special exercises that strengthen these muscles. To do the exercises, contract the muscles you would use to stop your stream of urine. But do this when you re not urinating. Hold for 10 seconds, then relax. Repeat 10 to 20 times in a row, at least 3 times a day. Your healthcare provider may give you other instructions for how to do the exercises and how often.    Keep a bladder diary. This helps track how often and how much you urinate over a set period of time. Bring this diary with you to your next visit with the provider. The information can help your provider learn more about your bladder problem.    Lose weight, if advised to by your provider. Extra weight puts  pressure on the bladder. Your provider can help you create a weight-loss plan that s right for you. This may include exercising more and making certain diet changes.    Don't have foods and drinks that may irritate the bladder. These can include alcohol and caffeinated drinks.    Quit smoking. Smoking and other tobacco use can lead to a long-term (chronic) cough that strains the pelvic floor muscles. Smoking may also damage the bladder and urethra. Talk with your provider about treatments or methods you can use to quit smoking.    If drinking large amounts of fluid makes you have symptoms, you may be advised to limit your fluid intake. You may also be advised to drink most of your fluids during the day and to limit fluids at night.    If you re worried about urine leakage or accidents, you may wear absorbent pads to catch urine. Change the pads often. This helps reduce discomfort. It may also reduce the risk of skin or bladder infections.    Follow-up care  Follow up with your healthcare provider, or as directed. It may take some to find the right treatment for your problem. But healthy lifestyle changes can be made right away. These include such things as exercising on a regular basis, eating a healthy diet, losing weight (if needed), and quitting smoking. Your treatment plan may include special therapies or medicines. Certain procedures or surgery may also be options. Talk about any questions you have with your provider.   When to seek medical advice  Call the healthcare provider right away if any of these occur:    Fever of 100.4 F (38 C) or higher, or as directed by your provider    Bladder pain or fullness    Belly swelling    Nausea or vomiting    Back pain    Weakness, dizziness, or fainting  [employer] last reviewed this educational content on 1/1/2020 2000-2021 [employer]. All rights reserved. This information is not intended as a substitute for professional medical care. Always follow your  healthcare professional's instructions.        Your Health Risk Assessment indicates you feel you are not in good emotional health.    Recreation   Recreation is not limited to sports and team events. It includes any activity that provides relaxation, interest, enjoyment, and exercise. Recreation provides an outlet for physical, mental, and social energy. It can give a sense of worth and achievement. It can help you stay healthy.    Mental Exercise and Social Involvement  Mental and emotional health is as important as physical health. Keep in touch with friends and family. Stay as active as possible. Continue to learn and challenge yourself.   Things you can do to stay mentally active are:    Learn something new, like a foreign language or musical instrument.     Play SCRABBLE or do crossword puzzles. If you cannot find people to play these games with you at home, you can play them with others on your computer through the Internet.     Join a games club--anything from card games to chess or checkers or lawn bowling.     Start a new hobby.     Go back to school.     Volunteer.     Read.   Keep up with world events.
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
## 2022-03-31 ASSESSMENT — PATIENT HEALTH QUESTIONNAIRE - PHQ9: SUM OF ALL RESPONSES TO PHQ QUESTIONS 1-9: 15

## 2023-02-22 DIAGNOSIS — I10 BENIGN ESSENTIAL HYPERTENSION: ICD-10-CM

## 2023-02-24 NOTE — TELEPHONE ENCOUNTER
"Routing refill request to provider for review/approval because:  Labs out of range:  BP  Break in medication    Last Written Prescription Date:  3/30/2022  Last Fill Quantity: 90,  # refills: 1   Last office visit provider:  3/30/2022     Requested Prescriptions   Pending Prescriptions Disp Refills     metoprolol succinate ER (TOPROL XL) 100 MG 24 hr tablet 90 tablet 1     Sig: Take 1 tablet (100 mg) by mouth daily       Beta-Blockers Protocol Failed - 2/22/2023 11:30 AM        Failed - Blood pressure under 140/90 in past 12 months     BP Readings from Last 3 Encounters:   03/30/22 (!) 130/90   04/15/20 (!) 160/92                 Passed - Patient is age 6 or older        Passed - Recent (12 mo) or future (30 days) visit within the authorizing provider's specialty     Patient has had an office visit with the authorizing provider or a provider within the authorizing providers department within the previous 12 mos or has a future within next 30 days. See \"Patient Info\" tab in inbasket, or \"Choose Columns\" in Meds & Orders section of the refill encounter.              Passed - Medication is active on med list             Carmen Arellano RN 02/24/23 2:30 AM  "

## 2023-02-24 NOTE — TELEPHONE ENCOUNTER
Last fill 3/30/22    Last seen 3/30/22    I10) Benign essential hypertension  Comment: Refill  Plan: metoprolol succinate ER (TOPROL-XL) 100 MG 24         hr tablet    No upcoming appointments

## 2023-02-27 RX ORDER — METOPROLOL SUCCINATE 100 MG/1
100 TABLET, EXTENDED RELEASE ORAL DAILY
Qty: 30 TABLET | Refills: 0 | Status: SHIPPED | OUTPATIENT
Start: 2023-02-27

## 2023-02-27 NOTE — TELEPHONE ENCOUNTER
I have not seen patient, re-routing to last provider that saw patient. Please refill if appropriate

## 2024-03-18 ENCOUNTER — NURSE TRIAGE (OUTPATIENT)
Dept: NURSING | Facility: CLINIC | Age: 49
End: 2024-03-18
Payer: MEDICARE

## 2024-03-18 NOTE — TELEPHONE ENCOUNTER
Nurse Triage SBAR    Is this a 2nd Level Triage? YES, LICENSED PRACTITIONER REVIEW IS REQUIRED    Situation: Rx refill Metoprolol  mg 24 hr tablet bridge until seen 3/26/24; pt out of medication as of yesterday 3/17/24    Background: Pt scheduled to see MD on 3/26/24, however pt is out of BP medications, out of Metoprolol today, last took yesterday 3/17/24.    Pt last seen 3/30/22  Pt is scheduled to be seen 3/26/24 which is the soonest available appt.    Protocol Recommended Disposition:   Discuss With PCP And Callback By Nurse Today    Recommendation: Pt requesting a refill for 12 days until seen in clinic.  Rx Metoprolol  mg 24 hr tablet.  Pharmacy [facility] on file.    Pt can be reached at [phone] or can be reached through [employer].    Thank you  Parris Webster RN  [facility] Nurse Advisor    Routed to provider    Reason for Disposition   Prescription refill request for NON-ESSENTIAL medicine (i.e., no harm to patient if med not taken) and triager unable to refill per department policy    Additional Information   Negative: New-onset or worsening symptoms, see that protocol (e.g., diarrhea, runny nose, sore throat)   Negative: Medicine question not related to refill or renewal   Negative: Caller (e.g., patient or pharmacist) requesting information about a new medicine   Negative: Caller requesting information unrelated to medicine   Negative: Prescription refill request for ESSENTIAL medicine (i.e., likelihood of harm to patient if not taken) and triager unable to refill per department policy   Negative: Prescription not at pharmacy and was prescribed by PCP recently  (Exception: triager has access to EMR and prescription is recorded there. Go to Home Care and confirm for pharmacy.)   Negative: Pharmacy calling with prescription questions and triager unable to answer question   Negative: Caller requesting a CONTROLLED substance prescription refill (e.g., narcotics, ADHD  medicines)    Protocols used: Medication Refill and Renewal Call-A-OH

## 2025-01-05 ENCOUNTER — HOSPITAL ENCOUNTER (EMERGENCY)
Facility: HOSPITAL | Age: 50
Discharge: HOME | End: 2025-01-05
Attending: EMERGENCY MEDICINE
Payer: MEDICARE

## 2025-01-05 VITALS
DIASTOLIC BLOOD PRESSURE: 102 MMHG | HEART RATE: 116 BPM | TEMPERATURE: 98.9 F | OXYGEN SATURATION: 97 % | WEIGHT: 181.2 LBS | BODY MASS INDEX: 30.15 KG/M2 | SYSTOLIC BLOOD PRESSURE: 142 MMHG | RESPIRATION RATE: 18 BRPM

## 2025-01-05 DIAGNOSIS — R19.7 DIARRHEA, UNSPECIFIED TYPE: ICD-10-CM

## 2025-01-05 DIAGNOSIS — R11.2 NAUSEA AND VOMITING, UNSPECIFIED VOMITING TYPE: ICD-10-CM

## 2025-01-05 DIAGNOSIS — J10.1 INFLUENZA A: Primary | ICD-10-CM

## 2025-01-05 DIAGNOSIS — E87.6 HYPOKALEMIA: ICD-10-CM

## 2025-01-05 DIAGNOSIS — E86.0 DEHYDRATION: ICD-10-CM

## 2025-01-05 LAB
ALBUMIN SERPL BCG-MCNC: 4.1 G/DL (ref 3.5–5.2)
ALP SERPL-CCNC: 143 U/L (ref 40–150)
ALT SERPL W P-5'-P-CCNC: 63 U/L (ref 0–50)
ANION GAP SERPL CALCULATED.3IONS-SCNC: 13 MMOL/L (ref 7–15)
AST SERPL W P-5'-P-CCNC: 46 U/L (ref 0–45)
BASOPHILS # BLD AUTO: 0 10E3/UL (ref 0–0.2)
BASOPHILS NFR BLD AUTO: 0 %
BILIRUB DIRECT SERPL-MCNC: <0.2 MG/DL (ref 0–0.3)
BILIRUB SERPL-MCNC: 0.2 MG/DL
BUN SERPL-MCNC: 12.9 MG/DL (ref 6–20)
CALCIUM SERPL-MCNC: 9.5 MG/DL (ref 8.8–10.4)
CHLORIDE SERPL-SCNC: 103 MMOL/L (ref 98–107)
CREAT SERPL-MCNC: 0.85 MG/DL (ref 0.51–0.95)
EGFRCR SERPLBLD CKD-EPI 2021: 83 ML/MIN/1.73M2
EOSINOPHIL # BLD AUTO: 0 10E3/UL (ref 0–0.7)
EOSINOPHIL NFR BLD AUTO: 0 %
ERYTHROCYTE [DISTWIDTH] IN BLOOD BY AUTOMATED COUNT: 14.7 % (ref 10–15)
FLUAV RNA SPEC QL NAA+PROBE: POSITIVE
FLUBV RNA RESP QL NAA+PROBE: NEGATIVE
GLUCOSE SERPL-MCNC: 108 MG/DL (ref 70–99)
HCO3 SERPL-SCNC: 23 MMOL/L (ref 22–29)
HCT VFR BLD AUTO: 44.2 % (ref 35–47)
HGB BLD-MCNC: 14.6 G/DL (ref 11.7–15.7)
IMM GRANULOCYTES # BLD: 0 10E3/UL
IMM GRANULOCYTES NFR BLD: 1 %
LACTATE SERPL-SCNC: 1.2 MMOL/L (ref 0.7–2)
LIPASE SERPL-CCNC: 35 U/L (ref 13–60)
LYMPHOCYTES # BLD AUTO: 1.4 10E3/UL (ref 0.8–5.3)
LYMPHOCYTES NFR BLD AUTO: 26 %
MAGNESIUM SERPL-MCNC: 2 MG/DL (ref 1.7–2.3)
MCH RBC QN AUTO: 27.4 PG (ref 26.5–33)
MCHC RBC AUTO-ENTMCNC: 33 G/DL (ref 31.5–36.5)
MCV RBC AUTO: 83 FL (ref 78–100)
MONOCYTES # BLD AUTO: 0.6 10E3/UL (ref 0–1.3)
MONOCYTES NFR BLD AUTO: 10 %
NEUTROPHILS # BLD AUTO: 3.4 10E3/UL (ref 1.6–8.3)
NEUTROPHILS NFR BLD AUTO: 62 %
NRBC # BLD AUTO: 0 10E3/UL
NRBC BLD AUTO-RTO: 0 /100
PLATELET # BLD AUTO: 228 10E3/UL (ref 150–450)
POTASSIUM SERPL-SCNC: 3.2 MMOL/L (ref 3.4–5.3)
PROT SERPL-MCNC: 7.1 G/DL (ref 6.4–8.3)
RBC # BLD AUTO: 5.32 10E6/UL (ref 3.8–5.2)
RSV RNA SPEC NAA+PROBE: NEGATIVE
SARS-COV-2 RNA RESP QL NAA+PROBE: NEGATIVE
SODIUM SERPL-SCNC: 139 MMOL/L (ref 135–145)
WBC # BLD AUTO: 5.4 10E3/UL (ref 4–11)

## 2025-01-05 PROCEDURE — 99284 EMERGENCY DEPT VISIT MOD MDM: CPT | Mod: 25

## 2025-01-05 PROCEDURE — 82248 BILIRUBIN DIRECT: CPT | Performed by: EMERGENCY MEDICINE

## 2025-01-05 PROCEDURE — 87637 SARSCOV2&INF A&B&RSV AMP PRB: CPT | Performed by: EMERGENCY MEDICINE

## 2025-01-05 PROCEDURE — 83735 ASSAY OF MAGNESIUM: CPT | Performed by: EMERGENCY MEDICINE

## 2025-01-05 PROCEDURE — 258N000003 HC RX IP 258 OP 636: Performed by: EMERGENCY MEDICINE

## 2025-01-05 PROCEDURE — 96374 THER/PROPH/DIAG INJ IV PUSH: CPT

## 2025-01-05 PROCEDURE — 96361 HYDRATE IV INFUSION ADD-ON: CPT

## 2025-01-05 PROCEDURE — 83690 ASSAY OF LIPASE: CPT | Performed by: EMERGENCY MEDICINE

## 2025-01-05 PROCEDURE — 250N000011 HC RX IP 250 OP 636: Performed by: EMERGENCY MEDICINE

## 2025-01-05 PROCEDURE — 85025 COMPLETE CBC W/AUTO DIFF WBC: CPT | Performed by: EMERGENCY MEDICINE

## 2025-01-05 PROCEDURE — 83605 ASSAY OF LACTIC ACID: CPT | Performed by: EMERGENCY MEDICINE

## 2025-01-05 PROCEDURE — 36415 COLL VENOUS BLD VENIPUNCTURE: CPT | Performed by: EMERGENCY MEDICINE

## 2025-01-05 PROCEDURE — 80053 COMPREHEN METABOLIC PANEL: CPT | Performed by: EMERGENCY MEDICINE

## 2025-01-05 RX ORDER — POTASSIUM CHLORIDE 1.5 G/1.58G
20 POWDER, FOR SOLUTION ORAL 2 TIMES DAILY
Qty: 6 PACKET | Refills: 0 | Status: SHIPPED | OUTPATIENT
Start: 2025-01-05 | End: 2025-01-08

## 2025-01-05 RX ORDER — ONDANSETRON 2 MG/ML
4 INJECTION INTRAMUSCULAR; INTRAVENOUS ONCE
Status: COMPLETED | OUTPATIENT
Start: 2025-01-05 | End: 2025-01-05

## 2025-01-05 RX ORDER — ONDANSETRON 4 MG/1
4 TABLET, ORALLY DISINTEGRATING ORAL EVERY 8 HOURS PRN
Qty: 10 TABLET | Refills: 0 | Status: SHIPPED | OUTPATIENT
Start: 2025-01-05 | End: 2025-01-08

## 2025-01-05 RX ADMIN — SODIUM CHLORIDE 1000 ML: 9 INJECTION, SOLUTION INTRAVENOUS at 17:38

## 2025-01-05 RX ADMIN — ONDANSETRON 4 MG: 2 INJECTION INTRAMUSCULAR; INTRAVENOUS at 17:44

## 2025-01-05 ASSESSMENT — COLUMBIA-SUICIDE SEVERITY RATING SCALE - C-SSRS
6. HAVE YOU EVER DONE ANYTHING, STARTED TO DO ANYTHING, OR PREPARED TO DO ANYTHING TO END YOUR LIFE?: NO
1. IN THE PAST MONTH, HAVE YOU WISHED YOU WERE DEAD OR WISHED YOU COULD GO TO SLEEP AND NOT WAKE UP?: NO
2. HAVE YOU ACTUALLY HAD ANY THOUGHTS OF KILLING YOURSELF IN THE PAST MONTH?: NO

## 2025-01-05 ASSESSMENT — ACTIVITIES OF DAILY LIVING (ADL)
ADLS_ACUITY_SCORE: 45
ADLS_ACUITY_SCORE: 41

## 2025-01-05 NOTE — ED TRIAGE NOTES
From home, recent travel to [place]. States was sick with body aches N/V/D. Returned on jan 03, sx not improved. Temp 101 this am. Endorses sore throat on right.      Triage Assessment (Adult)       Row Name 01/05/25 1549          Triage Assessment    Airway WDL WDL        Respiratory WDL    Respiratory WDL cough     Cough Frequency frequent     Cough Type dry

## 2025-01-05 NOTE — ED PROVIDER NOTES
EMERGENCY DEPARTMENT ENCOUNTER      NAME: Zeny Mantilla  AGE: 49 year old female  YOB: 1975  MRN: [id number]  EVALUATION DATE & TIME: No admission date for patient encounter.    PCP: No Ref-Primary, Physician    ED PROVIDER: Arelis Lema M.D.      Chief Complaint   Patient presents with    Nausea, Vomiting, & Diarrhea    Generalized Body Aches    Cough         FINAL IMPRESSION:  1. Influenza A    2. Nausea and vomiting, unspecified vomiting type    3. Diarrhea, unspecified type    4. Dehydration    5. Hypokalemia        ED COURSE & MEDICAL DECISION MAKING:    Pertinent Labs & Imaging studies reviewed. (See chart for details)  ED Course as of 01/05/25 1937   Sun Jan 05, 2025 1657 Patient is a very pleasant 49-year-old female comes in today for evaluation of nausea and vomiting and diarrhea as well as achiness and sweats and chills.  She was in [place] visiting family and developed symptoms on the first of the year.  She came back 2 days ago and still has not felt well.  She has not been able to eat anything since the first.  There is no blood in the vomiting or diarrhea.  She feels weak and continues to feel achy.  She is normally pretty healthy.  She has no known sick contacts but we have certainly seen a lot of viral illnesses going around this time of year.  Will check some basic labs and give her some fluids and some Zofran and see if we get her feeling better.  I discussed all this with her and she is comfortable with the plan.   1844 Patient tested positive for influenza A.  I think this is probably why she feels poorly.  I will check back in on her and see how she is doing after some IV fluids.   1929 Check back in with the patient and discussed results with her.  I will send Zofran and some potassium to her pharmacy and then we will work on getting her discharged home.  She is comfortable with that plan.   1936 Patient's potassium is little bit low and I did discuss it with her.  She is  nauseated right now so I do not give her any potassium but I told her she could take it in a few days to get her potassium back up.  She is comfortable with the plan.  I think she feels poorly because of the influenza A.  She is outside the window to get treated with Tamiflu but we can treat her symptomatically with some Zofran and then she can take potassium as needed.  She can take over-the-counter cough medicines.  She is in agreement.       Medical Decision Making    History:  Supplemental history from: None  External Record(s) reviewed: I reviewed Patient's hospitalization from 5/27/2024.  Patient had presented with flank pain and dysuria and was found to have sepsis related to pyelonephritis.  She grew out a pansensitive E Coli.    Work Up:  Emergent/Severe conditions considered and evaluated for: Electrolyte abnormality, hyperglycemia, hypoglycemia, acidosis, anemia, thrombocytopenia, renal failure, dehydration  I independently reviewed and interpreted none  In addition to work up documented, I considered the following work up: Considered CT of the belly but patient had no tenderness on my exam.  Medications given that require intensive monitoring for toxicity: None    External consultation:  Discussion of management with another provider: None    Complicating factors:  Care impacted by chronic illness: anemia, hypertension    Disposition considerations: Consider admission to the hospital but patient had no pneumonia and test positive for influenza A but without significant electrolyte derangements requiring admission.  Prescriptions considered/prescribed: Zofran and potassium        At the conclusion of the encounter I discussed  the results of all of the tests and the disposition with patient.   All questions were answered.  The patient acknowledged understanding and was involved in the decision making regarding the overall care plan.      I discussed with patient the utility, limitations and findings of  the exam/interventions/studies done during this visit as well as the list of differential diagnosis and symptoms to monitor/return to ER for.  Additional verbal discharge instructions were provided.     MEDICATIONS GIVEN IN THE EMERGENCY:  Medications   sodium chloride 0.9% BOLUS 1,000 mL (0 mLs Intravenous Stopped 1/5/25 1842)   ondansetron (ZOFRAN) injection 4 mg (4 mg Intravenous $Given 1/5/25 7541)       NEW PRESCRIPTIONS STARTED AT TODAY'S ER VISIT  New Prescriptions    ONDANSETRON (ZOFRAN ODT) 4 MG ODT TAB    Take 1 tablet (4 mg) by mouth every 8 hours as needed for nausea or vomiting.    POTASSIUM CHLORIDE (KLOR-CON) 20 MEQ PACKET    Take 20 mEq by mouth 2 times daily for 3 days.          =================================================================    HPI    Triage Note: From home, recent travel to [place]. States was sick with body aches N/V/D. Returned on jan 03, sx not improved. Temp 101 this am. Endorses sore throat on right.      Triage Assessment (Adult)       Row Name 01/05/25 1549          Triage Assessment    Airway WDL WDL        Respiratory WDL    Respiratory WDL cough     Cough Frequency frequent     Cough Type dry                   Use of : None       Zeny Mantilla is a 49 year old female who presents for evaluation of bodyaches and chills and nausea and vomiting and diarrhea has been going on for 5 days.  She is feeling weak and dehydrated and has a been able to eat for 4 days.    PAST MEDICAL HISTORY:  No past medical history on file.    PAST SURGICAL HISTORY:  Past Surgical History:   Procedure Laterality Date    LAPAROSCOPIC ASSISSTED TOTAL COLECTOMY W/ J-POUCH  1995       CURRENT MEDICATIONS:    No current facility-administered medications for this encounter.    Current Outpatient Medications:     ondansetron (ZOFRAN ODT) 4 MG ODT tab, Take 1 tablet (4 mg) by mouth every 8 hours as needed for nausea or vomiting., Disp: 10 tablet, Rfl: 0    potassium chloride (KLOR-CON) 20  MEQ packet, Take 20 mEq by mouth 2 times daily for 3 days., Disp: 6 packet, Rfl: 0    clotrimazole (LOTRIMIN) 1 % external cream, , Disp: , Rfl:     ferrous sulfate 325 (65 FE) MG tablet, [FERROUS SULFATE 325 (65 FE) MG TABLET] Take 1 tablet (325 mg total) by mouth 2 (two) times a day., Disp: 60 tablet, Rfl: 3    metoprolol succinate ER (TOPROL XL) 100 MG 24 hr tablet, Take 1 tablet (100 mg) by mouth daily, Disp: 30 tablet, Rfl: 0    polyethylene glycol (MIRALAX) 17 GM/Dose powder, Take 17 g by mouth, Disp: , Rfl:     ALLERGIES:  Allergies   Allergen Reactions    Oxycodone-Acetaminophen Other (See Comments) and Shortness Of Breath     Difficulty breathing , Abdominal Pain-Cramping, Nausea    Penicillins Anaphylaxis     PN: LW Reaction: ANAPHYLAXIS    Sulfa (Sulfonamide Antibiotics) [Sulfa Antibiotics] Anaphylaxis    Codeine Other (See Comments)     Difficulty breathing , PN: LW Reaction: RESPIRATORY DISTRESS    Morphine Other (See Comments)     Difficulty breathing , PN: LW Reaction: RESPIRATORY DISTRESS    Sulfasalazine Unknown     PN: LW Reaction: RESPIRATORY DISTRESS    Vicodin [Hydrocodone-Acetaminophen] Other (See Comments)     Difficulty breathing        FAMILY HISTORY:  No family history on file.    SOCIAL HISTORY:   Social History     Socioeconomic History    Marital status:    Tobacco Use    Smoking status: Former     Types: Cigars    Smokeless tobacco: Never   Substance and Sexual Activity    Alcohol use: No    Drug use: No     Social Drivers of Health     Financial Resource Strain: Low Risk  (5/22/2024)    Received from [facility]    Financial Resource Strain     Difficulty of Paying Living Expenses: 3   Food Insecurity: No Food Insecurity (5/27/2024)    [facility]    Hunger Vital Sign     Worried About Running Out of Food in the Last Year: Never true     Ran Out of Food in the Last Year: Never true   Transportation Needs: No Transportation Needs  (5/27/2024)    Received from [facility]    PRAPARE - Transportation     Lack of Transportation (Medical): No     Lack of Transportation (Non-Medical): No   Social Connections: Socially Integrated (5/22/2024)    Received from [facility]    Social Connections     Do you often feel lonely or isolated from those around you?: 0   Interpersonal Safety: Unknown (5/27/2024)    Received from [facility]    Humiliation, Afraid, Rape, and Kick questionnaire     Physically Abused: No     Sexually Abused: No   Housing Stability: Low Risk  (5/27/2024)    Received from [facility]    Housing Stability Vital Sign     Unable to Pay for Housing in the Last Year: No     Number of Places Lived in the Last Year: 1     Unstable Housing in the Last Year: No       PHYSICAL EXAM    VITAL SIGNS: /89   Pulse 115   Temp 98.9  F (37.2  C) (Oral)   Resp 18   Wt 82.2 kg (181 lb 3.2 oz)   LMP 11/25/2024   SpO2 97%   BMI 30.15 kg/m     GENERAL: Awake, alert, answering questions appropriately, appears uncomfortable  SPEECH:  Easy to understand speech, Normal volume and bautista  PULMONARY: No respiratory distress, Lungs clear to auscultation bilaterally  CARDIOVASCULAR: Regular tachycardic rate and rhythm, Distal pulses present and normal.  ABDOMINAL: Soft, Nondistended, Nontender, No rebound or guarding, No palpable masses  EXTREMITIES: No lower extremity edema.  PSYCH: Normal mood and affect     LAB:  All pertinent labs reviewed and interpreted.  Results for orders placed or performed during the hospital encounter of 01/05/25   Basic metabolic panel   Result Value Ref Range    Sodium 139 135 - 145 mmol/L    Potassium 3.2 (L) 3.4 - 5.3 mmol/L    Chloride 103 98 - 107 mmol/L    Carbon Dioxide (CO2) 23 22 - 29 mmol/L    Anion Gap 13 7 - 15 mmol/L    Urea Nitrogen 12.9 6.0 - 20.0 mg/dL    Creatinine 0.85 0.51 - 0.95 mg/dL    GFR Estimate 83 >60 mL/min/1.73m2    Calcium 9.5 8.8 - 10.4 mg/dL     Glucose 108 (H) 70 - 99 mg/dL   Hepatic function panel   Result Value Ref Range    Protein Total 7.1 6.4 - 8.3 g/dL    Albumin 4.1 3.5 - 5.2 g/dL    Bilirubin Total 0.2 <=1.2 mg/dL    Alkaline Phosphatase 143 40 - 150 U/L    AST 46 (H) 0 - 45 U/L    ALT 63 (H) 0 - 50 U/L    Bilirubin Direct <0.20 0.00 - 0.30 mg/dL   Result Value Ref Range    Lipase 35 13 - 60 U/L   Lactic acid whole blood with 1x repeat in 2 hr when >2   Result Value Ref Range    Lactic Acid, Initial 1.2 0.7 - 2.0 mmol/L   Result Value Ref Range    Magnesium 2.0 1.7 - 2.3 mg/dL   Influenza A/B, RSV and SARS-CoV2 PCR (COVID-19) Nasopharyngeal    Specimen: Nasopharyngeal; Swab   Result Value Ref Range    Influenza A PCR Positive (A) Negative    Influenza B PCR Negative Negative    RSV PCR Negative Negative    SARS CoV2 PCR Negative Negative   CBC with platelets and differential   Result Value Ref Range    WBC Count 5.4 4.0 - 11.0 10e3/uL    RBC Count 5.32 (H) 3.80 - 5.20 10e6/uL    Hemoglobin 14.6 11.7 - 15.7 g/dL    Hematocrit 44.2 35.0 - 47.0 %    MCV 83 78 - 100 fL    MCH 27.4 26.5 - 33.0 pg    MCHC 33.0 31.5 - 36.5 g/dL    RDW 14.7 10.0 - 15.0 %    Platelet Count 228 150 - 450 10e3/uL    % Neutrophils 62 %    % Lymphocytes 26 %    % Monocytes 10 %    % Eosinophils 0 %    % Basophils 0 %    % Immature Granulocytes 1 %    NRBCs per 100 WBC 0 <1 /100    Absolute Neutrophils 3.4 1.6 - 8.3 10e3/uL    Absolute Lymphocytes 1.4 0.8 - 5.3 10e3/uL    Absolute Monocytes 0.6 0.0 - 1.3 10e3/uL    Absolute Eosinophils 0.0 0.0 - 0.7 10e3/uL    Absolute Basophils 0.0 0.0 - 0.2 10e3/uL    Absolute Immature Granulocytes 0.0 <=0.4 10e3/uL    Absolute NRBCs 0.0 10e3/uL       Arelis Lema M.D.  Emergency Medicine  [facility] EMERGENCY DEPARTMENT  [facility] [id number]  [phone]  Dept: [phone]       Arelis Lema MD  01/05/25 4268

## 2025-01-06 NOTE — DISCHARGE INSTRUCTIONS
You were seen in the Emergency Department today for evaluation of nausea and vomiting and diarrhea as well as some dehydration.  Your lab work showed no other concerns or cause of your symptoms. You were prescribed Zofran to take as needed for the nausea. You should take all medications as prescribed.  Follow up with your primary care physician to ensure resolution of symptoms. Return if you have new or worsening symptoms.

## 2025-02-13 ENCOUNTER — VIRTUAL VISIT (OUTPATIENT)
Dept: ADDICTION MEDICINE | Facility: CLINIC | Age: 50
End: 2025-02-13
Payer: MEDICARE

## 2025-02-13 DIAGNOSIS — G47.00 INSOMNIA, UNSPECIFIED TYPE: ICD-10-CM

## 2025-02-13 DIAGNOSIS — R35.0 URINARY FREQUENCY: ICD-10-CM

## 2025-02-13 DIAGNOSIS — F15.20 METHAMPHETAMINE USE DISORDER, SEVERE (H): Primary | ICD-10-CM

## 2025-02-13 DIAGNOSIS — R32 URINARY INCONTINENCE, UNSPECIFIED TYPE: ICD-10-CM

## 2025-02-13 DIAGNOSIS — F32.A DEPRESSION, UNSPECIFIED DEPRESSION TYPE: ICD-10-CM

## 2025-02-13 PROBLEM — N12 PYELONEPHRITIS: Status: ACTIVE | Noted: 2024-05-23

## 2025-02-13 PROBLEM — R94.31 PROLONGED Q-T INTERVAL ON ECG: Status: ACTIVE | Noted: 2024-05-22

## 2025-02-13 PROBLEM — N39.0 URINARY TRACT INFECTION WITH HEMATURIA: Status: ACTIVE | Noted: 2024-05-22

## 2025-02-13 PROBLEM — R65.20 SEVERE SEPSIS (H): Status: ACTIVE | Noted: 2024-05-22

## 2025-02-13 PROBLEM — R31.9 URINARY TRACT INFECTION WITH HEMATURIA: Status: ACTIVE | Noted: 2024-05-22

## 2025-02-13 PROBLEM — A41.9 SEVERE SEPSIS (H): Status: ACTIVE | Noted: 2024-05-22

## 2025-02-13 PROCEDURE — 98003 SYNCH AUDIO-VIDEO NEW HI 60: CPT | Performed by: NURSE PRACTITIONER

## 2025-02-13 PROCEDURE — G2211 COMPLEX E/M VISIT ADD ON: HCPCS | Mod: 95 | Performed by: NURSE PRACTITIONER

## 2025-02-13 RX ORDER — METRONIDAZOLE 7.5 MG/G
1 GEL VAGINAL DAILY
COMMUNITY

## 2025-02-13 RX ORDER — BUPROPION HYDROCHLORIDE 150 MG/1
TABLET ORAL
Qty: 53 TABLET | Refills: 0 | Status: SHIPPED | OUTPATIENT
Start: 2025-02-13 | End: 2025-03-15

## 2025-02-13 RX ORDER — CLOBETASOL PROPIONATE 0.5 MG/ML
SOLUTION TOPICAL DAILY PRN
COMMUNITY

## 2025-02-13 RX ORDER — RAMELTEON 8 MG/1
8 TABLET ORAL AT BEDTIME
Qty: 15 TABLET | Refills: 0 | Status: SHIPPED | OUTPATIENT
Start: 2025-02-13

## 2025-02-13 RX ORDER — KETOCONAZOLE 20 MG/ML
SHAMPOO, SUSPENSION TOPICAL DAILY PRN
COMMUNITY
Start: 2024-04-11

## 2025-02-13 RX ORDER — NALTREXONE HYDROCHLORIDE 50 MG/1
TABLET, FILM COATED ORAL
Qty: 30 TABLET | Refills: 0 | Status: SHIPPED | OUTPATIENT
Start: 2025-02-13

## 2025-02-13 ASSESSMENT — PAIN SCALES - GENERAL: PAINLEVEL_OUTOF10: NO PAIN (0)

## 2025-02-13 ASSESSMENT — PATIENT HEALTH QUESTIONNAIRE - PHQ9
SUM OF ALL RESPONSES TO PHQ QUESTIONS 1-9: 17
SUM OF ALL RESPONSES TO PHQ QUESTIONS 1-9: 17
10. IF YOU CHECKED OFF ANY PROBLEMS, HOW DIFFICULT HAVE THESE PROBLEMS MADE IT FOR YOU TO DO YOUR WORK, TAKE CARE OF THINGS AT HOME, OR GET ALONG WITH OTHER PEOPLE: VERY DIFFICULT

## 2025-02-13 NOTE — PATIENT INSTRUCTIONS
Patient Education   Addiction Medicine  What to Expect  Here's what to expect from our Addiction Medicine program.  About Addiction Medicine  Addiction Medicine clinics help you with substance use problems. You set your own goals. We try to help you reach your goals. Your care plan can include:  Medicine  Creating a recovery plan  Helping you find local resources  Helping with treatment options  Clinic phone number and addresses  Clinic Phone: [phone]  Mental Health and Addiction Clinic  [facility]  [address], [address]  [place] [address]  [facility]  [address], [address]  [place] [address]  Walk-in services  We offer walk-in care for patients at the Recovery Clinic. This is only for patients with Opioid Use Disorder (OUD). Anyone with OUD is welcome. Our providers will refer you to the Recovery Clinic if you're struggling to keep up with your medicines or appointments.  Recovery Clinic ([facility])  [address], [address]  [place] [address]  Phone: [phone]  The Recovery Clinic is open for walk-ins Monday to Friday 9 a.m. to 11:30 a.m. and 12:30 p.m. to 3 p.m.  How it works  Come to your visits every time. The treatment works better when you do.   You can have as many visits as you need. When you're better, we'll refer you back to being cared for by your family doctor.   If you need it, we'll send you to doctors, psychiatrists, therapists, and other providers. We focus on treating addiction. We don't treat other problems, like managing other medicines or non-addiction issues.  About visits  Urine drug testing  We'll often test your pee (urine) for drugs. This is the only way we can know for sure whether or not you're using drugs. It helps us treat you without judgement.   Suboxone (buprenorphine)  If you're taking buprenorphine, you'll have a lot of visits at first. If your problem is getting worse, or you're  "using substances, we may schedule you for extra visits.   Cancelling visits  If you can't come to your visit, please call us right away at [phone]. If you don't cancel at least 24 hours (1 full day) before your visit, that's \"late cancellation.\"  Being late to visits  If you come late, you may not be seen. This will count as a \"no-show.\"  Please call the clinic if you're running late. This will help us plan, but it doesn't mean you'll be seen.   Being late is:  More than 15 minutes late for a return visit.  More than 30 minutes late for your first visit.  If you cancel late or don't show up 2 times within 6 months, we may transfer you to another clinic.   Getting help between visits  If you need help between visits, you can call us Monday to Friday from 8 a.m. to 4:30 p.m. at [phone]. You can also send us a message on [employer].  Medicine refills  If you miss or cancel a visit, you can still ask for a refill. But we can only refill your medicines if you've made a new appointment.  Please call your pharmacy for medicine refills. If you have a question about your refill, call us at [phone].  It takes up to 2 business days to refill your drugs. Let us know 2 to 3 days before you run out. Don't call more than 1 week before you run out. That's too early.   Please make sure we have your right phone number.  If we have a problem with your refill, we'll call you. If we call you, please call us back right away. If you don't, you may not get your medicines quickly.   Call your pharmacy to find out if your medicines are ready.   Keep your medicines in a safe place. Keep them away from pets and children. If your medicines are lost or stolen, we usually don't replace them. We recommend you file a police report if your medicines are stolen. Your insurance may not pay for early refills, even if you have a prescription.  Forms  Please give us at least 3 business days to fill out any forms. Bring the forms to your " visits if you can. We may refer you to other members of your care team to complete the forms.   Emergency care   Call 911 or go to the nearest emergency room if your life or someone else's life is in danger.  Call 988 anytime for the Suicide and Crisis Lifeline.  If you need care when we're closed, call your family doctor to see if they can help. You can also go to urgent care or an emergency room. [facility] emergency rooms may be able to give you buprenorphine or other medicine refills.  Thank you for choosing us for your care.  For informational purposes only. Not to replace the advice of your health care provider. Copyright   2023 [facility]. All rights reserved. [employer] [id number] - REV 05/23.

## 2025-02-13 NOTE — Clinical Note
Pt wanted to sign TIESHA for , can you please send her the paperwork to complete for this request? Thank you - Cecilia

## 2025-02-13 NOTE — PROGRESS NOTES
Virtual Visit Details  Type of service:  Video Visit   Video Start Time:  2:40 PM  Video End Time:3:31 PM  Originating Location (pt. Location): Home  Distant Location (provider location):  On-site  Platform used for Video Visit: Mitesh CRUZ                                                      Zeny Mantilla is a 49 year old female who presents for initial visit for MEDICARE TAY eval referred by outside provider due to concerns for Stimulant Use Disorder (methamphetamine).     Visit performed Virtual, via video    HPI:  Zeny Mantilla is a 49 year old female with history of depression, anxiety, agoraphobia, prolonged QT interval on ECG, and methamphetamine use who presents for further evaluation of possible substance use disorder and management options.    Reports methamphetamine use starting 10 years ago. At the time she was using once every few months when she was going out of town. She meet more people in [place] that used as well, access increased and she started using more. Over the past year she has been using methamphetamine 1-2 times per week. Previously using up to 4-5 times per week, this was prior to going to senior living in April 2023. Denies current use. Last use was 3 weeks ago. Intermittent cravings. Has not been on medication in the past for methamphetamine cravings. She is interested in medication. Denies h/o of seizures. H/o withdrawal from stimulant use including, fatigue, lack of sleep, sweating. Endorses over eating because she is not using methamphetamines. Currently on probation and she is required to completed Rule 25. She is interested in outpatient programming.     H/o depression and anxiety. Previously tried Lexapro, Effexor, and Ativan. Felt Lexapro was effective. She had to stop the medication because it was not covered by insurance.     Frequent urination for the past year, worsening in the past 3 months. Incontinence during the day and at night. No blood in urine. No bowel incontinence.  No h/o of spinal cord injury. She is wearing pull ups or depends. Very frustrating. H/o urinary tract infection and pyelonephritis. No h/o recurrent UTI. Constipation chronic managed with miralax.     Hoping to start medication for sleep. Has previously tried Trazodone. Adverse SE.       DSM 5 criteria met for StUD:   Methamphetamine is often taken in larger amounts or over a longer period than was intended. - YES   There is a persistent desire or unsuccessful efforts to cut down or control use. - YES   A great deal of time is spent in activities necessary to obtain, use, or recover from its effects. - YES   Craving, or a strong desire or urge to use. - YES   Recurrent  use resulting in a failure to fulfill major role obligations at work, school, or home. - YES   Continued use despite having persistent or recurrent social or interpersonal problems caused or exacerbated by the effects of methamphetamine. - NO  Important social, occupational, or recreational activities are given up or reduced because of use. - NO  Recurrent use in situations in which it is physically hazardous. - NO  Methamphetamine use is continued despite knowledge of having a persistent or recurrent physical or psychological problem that is likely to have been caused or exacerbated by methamphetamine. - YES   Tolerance - YES   Withdrawal- YES    303.9 (F10.2) Severe: Presence of 6 or more symptoms.     Substance Use History:   Most recent use, past or recent hx of harmful use  ALCOHOL - Denies   CANNABIS - Yes, intermittent, has medical card for this.   PRESCRIPTION STIMULANTS - Denies   COCAINE/CRACK - Denies   METH/AMPHETAMINES - Yes, last use 1/23/25, couple times per week, about 3 grams. Inhalation.   OPIATES - Denies   BENZODIAZEPINES - No other than prescribed Ativan in the past.   KRATOM - Denies   KETAMINE - Denies   HALLUCINOGENS (including DXM) - Denies   BEHAVIORAL (Gambling, Eating d/o, Compulsivity) - Denies   NICOTINE  Cigarettes: 1  "pk per day, previously quit for 10 years   Past NRT/medication use: has patches in the past.       Previous withdrawal treatment episodes (e.g. detox): denies   Previous TAY treatment programs: denies   Hospitalizations or overdose: denies   Medical complications from substance use: denies   IV Drug use?: Denies   Previous Medication for Addiction Tx: denies   Longest period of full abstinence: 9 months   Activities that have previously supported abstinence: supportive community and avoiding social settings   Current Recovery Activities: none       Infectious disease screening  Hep C: negative per pt    HIV: negative per pt  No results found for: \"HIV\"       Pregnancy Status- not pregnant   LMP: 2/6/25      Psychiatric History (per patient report and problem list review)  Past diagnoses - ***  Current or past psychiatrist: not currently would like a referral   Current or past therapist:  not currently would like a referral   Hospitalizations/TMS/ECT - Once as an adolescent   Suicide Attempts - Denies   Medication trials - Lexapro, Effexor and ativan       PHQ-9 scores:      3/30/2022    12:14 PM 2/13/2025     2:17 PM   PHQ   PHQ-9 Total Score 15 17    Q9: Thoughts of better off dead/self-harm past 2 weeks Not at all  Not at all        Proxy-reported     GEOVANI-7 scores:       No data to display                  SOCIAL HISTORY:  Housing status: alone  Employment status: Disabled  Relationship status: Single  Children: 4, adult children   Legal concerns related to use: probation   Contact information up to date? YES     3rd Party Involvement -  ***       Medical History:    Patient Active Problem List    Diagnosis Date Noted    Methamphetamine use disorder, severe (H) 02/13/2025     Priority: Medium    Pyelonephritis 05/23/2024     Priority: Medium    Prolonged Q-T interval on ECG 05/22/2024     Priority: Medium    Severe sepsis (H) 05/22/2024     Priority: Medium    Urinary tract infection with " hematuria 05/22/2024     Priority: Medium    Iron deficiency anemia 08/29/2017     Priority: Medium    Depression 04/25/2017     Priority: Medium       No past medical history on file.    Past Surgical History:   Procedure Laterality Date    LAPAROSCOPIC ASSISSTED TOTAL COLECTOMY W/ J-POUCH  1995         No family history on file.  Mother with AUD     Current Outpatient Medications   Medication Sig Dispense Refill    buPROPion (WELLBUTRIN XL) 150 MG 24 hr tablet Take 1 tablet (150 mg) by mouth every morning for 7 days, THEN 2 tablets (300 mg) every morning for 23 days. 53 tablet 0    clotrimazole (LOTRIMIN) 1 % external cream       ferrous sulfate 325 (65 FE) MG tablet [FERROUS SULFATE 325 (65 FE) MG TABLET] Take 1 tablet (325 mg total) by mouth 2 (two) times a day. 60 tablet 3    ketoconazole (NIZORAL) 2 % external shampoo Apply topically daily as needed.      metoprolol succinate ER (TOPROL XL) 100 MG 24 hr tablet Take 1 tablet (100 mg) by mouth daily 30 tablet 0    naltrexone (DEPADE/REVIA) 50 MG tablet Take 1/2 tablet daily x 5 days then increase to 1 tablet daily. 30 tablet 0    polyethylene glycol (MIRALAX) 17 GM/Dose powder Take 17 g by mouth      ramelteon (ROZEREM) 8 MG tablet Take 1 tablet (8 mg) by mouth at bedtime. 15 tablet 0    clobetasol (TEMOVATE) 0.05 % external solution Apply topically daily as needed (itching).      metroNIDAZOLE (METROGEL) 0.75 % vaginal gel Place 1 Applicatorful vaginally daily.           Allergies   Allergen Reactions    Oxycodone-Acetaminophen Other (See Comments) and Shortness Of Breath     Difficulty breathing , Abdominal Pain-Cramping, Nausea    Penicillins Anaphylaxis     PN: LW Reaction: ANAPHYLAXIS    Sulfa (Sulfonamide Antibiotics) [Sulfa Antibiotics] Anaphylaxis    Codeine Other (See Comments)     Difficulty breathing , PN: LW Reaction: RESPIRATORY DISTRESS    Morphine Other (See Comments)     Difficulty breathing , PN: LW Reaction: RESPIRATORY DISTRESS     "Sulfasalazine Unknown     PN: LW Reaction: RESPIRATORY DISTRESS    Vicodin [Hydrocodone-Acetaminophen] Other (See Comments)     Difficulty breathing            OBJECTIVE                                                      EXAM    LMP 11/25/2024     { Exam Brief:435525::\"GENERAL: healthy, alert and no distress\",\"EYES: Eyes grossly normal to inspection, PERRL and conjunctivae and sclerae normal\",\"RESP: No respiratory distress\",\"MENTAL STATUS EXAM\"}      LAB:   Recent UDS Labs (may not contain today's lab data)  No results found for: \"BUP\", \"BZO\", \"BAR\", \"LAURE\", \"MAMP\", \"AMP\", \"MDMA\", \"MTD\", \"AVT331\", \"OXY\", \"PCP\", \"THC\", \"TEMP\", \"SGPOCT\"      Hepatic Function  AST   Date Value Ref Range Status   01/05/2025 46 (H) 0 - 45 U/L Final     ALT   Date Value Ref Range Status   01/05/2025 63 (H) 0 - 50 U/L Final     Bilirubin Total   Date Value Ref Range Status   01/05/2025 0.2 <=1.2 mg/dL Final     Albumin   Date Value Ref Range Status   01/05/2025 4.1 3.5 - 5.2 g/dL Final   03/30/2022 3.9 3.5 - 5.0 g/dL Final       CBC  WBC Count   Date Value Ref Range Status   01/05/2025 5.4 4.0 - 11.0 10e3/uL Final     RBC Count   Date Value Ref Range Status   01/05/2025 5.32 (H) 3.80 - 5.20 10e6/uL Final     Hemoglobin   Date Value Ref Range Status   01/05/2025 14.6 11.7 - 15.7 g/dL Final     Hematocrit   Date Value Ref Range Status   01/05/2025 44.2 35.0 - 47.0 % Final     MCV   Date Value Ref Range Status   01/05/2025 83 78 - 100 fL Final     MCH   Date Value Ref Range Status   01/05/2025 27.4 26.5 - 33.0 pg Final     MCHC   Date Value Ref Range Status   01/05/2025 33.0 31.5 - 36.5 g/dL Final     Platelet Count   Date Value Ref Range Status   01/05/2025 228 150 - 450 10e3/uL Final     RDW   Date Value Ref Range Status   01/05/2025 14.7 10.0 - 15.0 % Final       Today's lab data  No results found for any visits on 02/13/25.      [facility] [place] Board of Pharmacy Data Base Reviewed; Consistent with patient reports and Epic records.   "     A/P                                                      ASSESSMENT/PLAN:  Zeny was seen today for consult.    Diagnoses and all orders for this visit:    Methamphetamine use disorder, severe (H)  -     buPROPion (WELLBUTRIN XL) 150 MG 24 hr tablet; Take 1 tablet (150 mg) by mouth every morning for 7 days, THEN 2 tablets (300 mg) every morning for 23 days.  -     naltrexone (DEPADE/REVIA) 50 MG tablet; Take 1/2 tablet daily x 5 days then increase to 1 tablet daily.  -     Adult Mental Health  Referral; Future    Depression, unspecified depression type  -     Adult Mental Health  Referral; Future  -     buPROPion (WELLBUTRIN XL) 150 MG 24 hr tablet; Take 1 tablet (150 mg) by mouth every morning for 7 days, THEN 2 tablets (300 mg) every morning for 23 days.  -     Adult Mental Health  Referral; Future    Insomnia, unspecified type  -     Adult Mental Health  Referral; Future  -     ramelteon (ROZEREM) 8 MG tablet; Take 1 tablet (8 mg) by mouth at bedtime.    Urinary frequency    Urinary incontinence, unspecified type        - bupropion and naltrexone for StUD   - Comprehensive assessment Medicare TAY eval referral   - individual therapy referral   - psychiatry referral   - urology referral   - Ramelteon for sleep, precautions and warning given. Adverse SE from Trazodone. Avoiding mirtazapine and doxepin d/t increase risk for prolonged QT interval. H/o prolonged QT on ECG.      Continued Complex Management  The longitudinal plan of care for Stimulant Use Disorder was addressed during this visit. Due to the added complexity in care, I will continue to support Zeny in the subsequent management and with ongoing continuity of care.       Counseled the patient on the importance of having a recovery program in addition to medication to manage recovery.  Components include avoiding isolating, having willingness to change, avoiding triggers and managing cravings. Encouraged having  some type of sober network and practicing honesty with trusted support person(s). Encouraged other services such as counseling, 12 step or other self-help organizations.        RTC   2 weeks       TAMMIE Serna [facility]  [phone]      Answers submitted by the patient for this visit:  Patient Health Questionnaire (Submitted on 2/13/2025)  If you checked off any problems, how difficult have these problems made it for you to do your work, take care of things at home, or get along with other people?: Very difficult  PHQ9 TOTAL SCORE: 17     these problems made it for you to do your work, take care of things at home, or get along with other people?: Very difficult  PHQ9 TOTAL SCORE: 17

## 2025-02-13 NOTE — NURSING NOTE
Current patient location: [address] [place] [address]    Is the patient currently in the state of [place]? YES    Visit mode: VIDEO    If the visit is dropped, the patient can be reconnected by:VIDEO VISIT: Text to cell phone:   Telephone Information:   Mobile [phone]       Will anyone else be joining the visit? NO  (If patient encounters technical issues they should call [phone] :[phone])    Are changes needed to the allergy or medication list? Pt stated no changes to allergies   patient stated they will sometimes take Gabapentin when their legs are hurting    Are refills needed on medications prescribed by this physician? NO    Rooming Documentation:  Questionnaire(s) completed    Reason for visit: Consult    Stefania Vance [facility]    Care team has reviewed attendance agreement with patient. Patient advised that two failed appointments within 6 months may lead to termination of current episode of care.

## 2025-02-14 ENCOUNTER — TELEPHONE (OUTPATIENT)
Dept: ADDICTION MEDICINE | Facility: CLINIC | Age: 50
End: 2025-02-14
Payer: MEDICARE

## 2025-02-14 NOTE — TELEPHONE ENCOUNTER
Prior authorization is needed for the following medication:          Quantity limit exceeded. Limit of 1 tablet per day. This is a titration prescription, so next prescription can be written for 1 tab Wellbutrin  mg tab.    Routing to PA team to initiate prior auth. Thanks!

## 2025-02-14 NOTE — TELEPHONE ENCOUNTER
"Retail Pharmacy Prior Authorization Team   Phone: [phone]      PRIOR AUTHORIZATION DENIED    Medication: RAMELTEON 8 MG PO TABS  Insurance Company: [employer] Part D - Phone [phone] Fax [phone]  Denial Date: 2/14/2025  Denial Reason(s):       Appeal Information: To send an appeal to the patient's insurance, please provide a letter of medical necessity for the requested medication that was denied.  Please use the denial rationale from the patient's insurance to write the letter.  Once it is completed, please have it available under the \"letters tab\" in the patient's chart and route directly back to me: BRYANT QUINTANILLA and I can send the appeal to the patient's insurance.     Patient Notified: No. The [facility] PA Team does not notify of the denial outcomes as the patient often will ask what their provider will prescribe in place of the denied medication, or additional information in regards to other therapies they can take in place of the denied medication.  This is not something we can advise on in our department.    "

## 2025-02-14 NOTE — TELEPHONE ENCOUNTER
Retail Pharmacy Prior Authorization Team   Phone: [phone]      CMM KEY: (Key: [id number]    PA Initiation    Medication: RAMELTEON 8 MG PO TABS  Insurance Company: [employer] Part D - Phone [phone] Fax [phone]  Pharm[facility]illing the Rx[id number] - [place], [place] - [address] [place] [address] AT [facility]  Filling Pharmacy Phone: [phone]  Filling Pharmacy Fax:    Start Date: 2/14/2025

## 2025-02-14 NOTE — TELEPHONE ENCOUNTER
Prior authorization is needed for the following medication:          Routing to PA team to initiate prior auth. Thanks!

## 2025-02-14 NOTE — TELEPHONE ENCOUNTER
Prior Authorization Approval    Medication: BUPROPION HCL ER (XL) 150 MG PO TB24  Authorization Effective Date: 1/1/2025  Authorization Expiration Date: 12/31/2025  Approved Dose/Quantity:   Reference #:     Insurance Company: [employer] Part D - Phone [phone] Fax [phone]  Expected CoPay: $    CoPay Card Available:      Financial Assistance Needed: No  Which Pharmacy is filling the prescription: [id number] - [place], [place] - [address] [place] [address]  Pharmacy Notified: Yes  Patient Notified: The pharmacy will notify the patient.

## 2025-02-17 NOTE — TELEPHONE ENCOUNTER
Retail Pharmacy Prior Authorization Team   Phone: [phone]      Medication Appeal Initiation    Medication: RAMELTEON 8 MG PO TABS  Appeal Start Date:  2/17/2025  Insurance Company: [employer] PART D  Insurance Phone:   Insurance Fax:   Comments:   Sent appeal request through [facility] as they offer E-Appeals

## 2025-02-18 NOTE — TELEPHONE ENCOUNTER
Retail Pharmacy Prior Authorization Team   Phone: [phone]      MEDICATION APPEAL APPROVED    Medication: RAMELTEON 8 MG PO TABS  Authorization Effective Date: 1/1/2025  Authorization Expiration Date: 2/17/2026  Approved Dose/Quantity:   Reference #: (Key: [id number]) PA Case ID #: [id number]   Appeal Insurance Company: [employer] Medicare Part D  Expected CoPay: $       CoPay Card Available: No  Financial Assistance Needed:   Filling Pharmacy: [id number] - [place], [place] - [address]  Patient Notified: **Instructed pharmacy to notify patient when script is ready to /ship.**  Comments:

## 2025-02-26 ENCOUNTER — HOSPITAL ENCOUNTER (OUTPATIENT)
Dept: BEHAVIORAL HEALTH | Facility: CLINIC | Age: 50
Discharge: HOME | End: 2025-02-26
Attending: FAMILY MEDICINE
Payer: MEDICARE

## 2025-02-26 DIAGNOSIS — F15.20 METHAMPHETAMINE USE DISORDER, SEVERE (H): ICD-10-CM

## 2025-02-26 PROCEDURE — H0001 ALCOHOL AND/OR DRUG ASSESS: HCPCS

## 2025-02-26 ASSESSMENT — PATIENT HEALTH QUESTIONNAIRE - PHQ9: SUM OF ALL RESPONSES TO PHQ QUESTIONS 1-9: 21

## 2025-02-26 ASSESSMENT — ANXIETY QUESTIONNAIRES
4. TROUBLE RELAXING: MORE THAN HALF THE DAYS
6. BECOMING EASILY ANNOYED OR IRRITABLE: NEARLY EVERY DAY
2. NOT BEING ABLE TO STOP OR CONTROL WORRYING: MORE THAN HALF THE DAYS
3. WORRYING TOO MUCH ABOUT DIFFERENT THINGS: MORE THAN HALF THE DAYS
GAD7 TOTAL SCORE: 18
1. FEELING NERVOUS, ANXIOUS, OR ON EDGE: NEARLY EVERY DAY
5. BEING SO RESTLESS THAT IT IS HARD TO SIT STILL: NEARLY EVERY DAY
GAD7 TOTAL SCORE: 18
7. FEELING AFRAID AS IF SOMETHING AWFUL MIGHT HAPPEN: NEARLY EVERY DAY

## 2025-02-26 ASSESSMENT — COLUMBIA-SUICIDE SEVERITY RATING SCALE - C-SSRS
1. HAVE YOU WISHED YOU WERE DEAD OR WISHED YOU COULD GO TO SLEEP AND NOT WAKE UP?: NO
2. HAVE YOU ACTUALLY HAD ANY THOUGHTS OF KILLING YOURSELF?: NO

## 2025-02-26 NOTE — PROGRESS NOTES
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         Step 4: People whom I can ask for help during a crisis:   No contacts identified     Step 5: Professionals or agencies I can contact during a crisis:      Suicide Prevention Lifeline Phone: Call or Text 299  Crisis Text Line: Text HOME to 199435     Step 6: Making the environment safer (plan for lethal means safety):   Did not identify any lethal methods     Optional: What is most important to me and worth living for?:      [place]leni Safety Plan. Nancy Cook and Yann Evans. Used with permission of the authors.       Attestation:   Dr. Kwok - Medical Director - Provides oversight and supervision of care.    Provider [facility]/ Credentials:  [facility]   February 26, 2025

## 2025-02-27 ENCOUNTER — TELEPHONE (OUTPATIENT)
Dept: ADDICTION MEDICINE | Facility: CLINIC | Age: 50
End: 2025-02-27
Payer: MEDICARE

## 2025-02-27 NOTE — TELEPHONE ENCOUNTER
----- Message from Cecilia Ohara sent at 2/27/2025  1:01 PM CST -----  Routing to our RN team to follow up with the patient. Her medications required PA, one was approved and the other was approved after an appeal. So currently to my knowledge all medications were approved.     TAMMIE Serna CNP on 2/27/2025 at 1:01 PM  ----- Message -----  From: Heather Lucas [facility]  Sent: 2/27/2025  12:56 PM CST  To: TAMMIE Serna CNP,     I met with Zeny for a TAY eval and she reported to me that she was unable to get some of the medications you prescribed her due to insurance not covering them. I am wondering if you are able to work with Zeny on these medications as she has a desire to start taking them. I believe she is seeing you tomorrow so just wanted to get a message to you before her appointment.     Thank you,     Heather

## 2025-02-27 NOTE — TELEPHONE ENCOUNTER
Reviewed, thank you for following up with the patient.     TAMMIE Serna CNP on 2/27/2025 at 2:32 PM

## 2025-02-27 NOTE — TELEPHONE ENCOUNTER
Called and spoke with patient.     Patient indicated that she has not started any of her medications due to an insurance issue.    Informed pt that prior authorization was needed for her Wellbutrin XL and Rozerem.     Let her know that Rozerem was initially denied but then approved after Cecilia appealed the decision and that Wellbutrin XL was approved.    Pt has not yet started the naltrexone indicating that Cecilia wanted her to start the Wellbutrin first.    Encouraged patient to contact her pharmacy to ensure that medications are filled and ready for  and that she could ask what the cost of her meds is as well.    Pt rescheduled her appointment that was scheduled tomorrow, 2/28/2025 to next Wednesday, 3/5/2025 so that she can start her medications prior to her f/up appointment.    Pt very appreciative for phone call. RN apologized to patient for not being aware that her medications were covered, as the pharmacy was supposed to contact patient to let her know.    Routing to Cecilia Ohara CNP.

## 2025-03-03 ENCOUNTER — MYC MEDICAL ADVICE (OUTPATIENT)
Dept: ADDICTION MEDICINE | Facility: CLINIC | Age: 50
End: 2025-03-03
Payer: MEDICARE

## 2025-03-03 NOTE — TELEPHONE ENCOUNTER
1) Received notification from Cecilia Ohara via staff message:       2) Called [facility] in [place] ([phone]). They have Wellbutrin XL 150mg and Ramelteon 8mg ready for the patient and the cost for both of them together is $1.60. The patient picked up a 30 day supply of Naltrexone 10 days ago, so that is too early to refill.     3) Called and spoke with the patient. She reports that she picked up Naltrexone on 2/13/25 but hasn't started it because she was advised to start it a week after starting Wellbutrin. She hasn't been able to  Wellbutrin or Rozerem, therefore, hasn't started these medications either. She will  her medication from the pharmacy and start as directed    She is wondering if she should reschedule her 3/5/25 appointment since she hasn't started any of the medication.     4) She would like a return phone call with france SANDOVAL RN on 3/3/2025 at 4:20 PM

## 2025-03-04 ENCOUNTER — DOCUMENTATION ONLY (OUTPATIENT)
Dept: BEHAVIORAL HEALTH | Facility: CLINIC | Age: 50
End: 2025-03-04
Payer: MEDICARE

## 2025-03-04 NOTE — TELEPHONE ENCOUNTER
Called and spoke to patient. Reviewed Cecilia Ohara's directives. She verbalized understanding.     Provided the number to reschedule and transferred patient to [facility].     CUATE SANDOVAL RN on 3/4/2025 at 11:58 AM

## 2025-03-04 NOTE — TELEPHONE ENCOUNTER
Yes, if she is able to refill today, OK to start Wellbutrin first.     Ok to reschedule for ~ 2-3 weeks for medication check.     TAMMIE Serna CNP on 3/4/2025 at 10:14 AM

## 2025-03-04 NOTE — TELEPHONE ENCOUNTER
Ok to start Naltrexone. Would recommend starting Naltrexone and Wellbutrin 1 week apart so we can tell if adverse SE do occur what medication is the cause.     TAMMIE Serna CNP on 3/4/2025 at 9:21 AM

## 2025-03-04 NOTE — TELEPHONE ENCOUNTER
1) Called and spoke with the patient to discuss directives. Pt is on the way to  Wellbutrin and Rozerem. She would like to start Wellbutrin before starting Naltrexone if provider determines appropriate.     2) Also wondering if she should reschedule the appointment tomorrow 3/5/25 since she hasn't started the medication yet.       CUATE SANDOVAL RN on 3/4/2025 at 10:05 AM
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
## 2025-03-10 ENCOUNTER — TELEPHONE (OUTPATIENT)
Dept: BEHAVIORAL HEALTH | Facility: CLINIC | Age: 50
End: 2025-03-10
Payer: MEDICARE

## 2025-03-10 NOTE — TELEPHONE ENCOUNTER
3/10/25-      to discuss insurance. Per ITZEL Goodman states patient has [employer]. Asked patient to verify status of [employer]. Provided contact info.     DUY Drake 3/10/2025 12:03 PM

## 2025-03-12 ENCOUNTER — TELEPHONE (OUTPATIENT)
Dept: ADDICTION MEDICINE | Facility: HOSPITAL | Age: 50
End: 2025-03-12
Payer: MEDICARE

## 2025-03-12 NOTE — TELEPHONE ENCOUNTER
3/12/25-      for Zeny to call this writer to discuss insurance benefits. Provided contact information. Per ITZEL patient has a [employer] plan and we do not currently accept that plan so we would like to verify prior to her service initiation on 3/17/25.     Heather Lucas, [facility] 3/12/2025 10:58 AM

## 2025-03-15 ENCOUNTER — HEALTH MAINTENANCE LETTER (OUTPATIENT)
Age: 50
End: 2025-03-15

## 2025-03-17 ENCOUNTER — HOSPITAL ENCOUNTER (OUTPATIENT)
Dept: BEHAVIORAL HEALTH | Facility: CLINIC | Age: 50
Discharge: HOME | End: 2025-03-17
Attending: FAMILY MEDICINE
Payer: MEDICARE

## 2025-03-17 ENCOUNTER — DOCUMENTATION ONLY (OUTPATIENT)
Dept: BEHAVIORAL HEALTH | Facility: CLINIC | Age: 50
End: 2025-03-17
Payer: MEDICARE

## 2025-03-17 DIAGNOSIS — F15.20 METHAMPHETAMINE USE DISORDER, SEVERE (H): Primary | ICD-10-CM

## 2025-03-17 PROCEDURE — H2035 A/D TX PROGRAM, PER HOUR: HCPCS

## 2025-03-17 ASSESSMENT — ANXIETY QUESTIONNAIRES
IF YOU CHECKED OFF ANY PROBLEMS ON THIS QUESTIONNAIRE, HOW DIFFICULT HAVE THESE PROBLEMS MADE IT FOR YOU TO DO YOUR WORK, TAKE CARE OF THINGS AT HOME, OR GET ALONG WITH OTHER PEOPLE: VERY DIFFICULT
2. NOT BEING ABLE TO STOP OR CONTROL WORRYING: SEVERAL DAYS
GAD7 TOTAL SCORE: 15
1. FEELING NERVOUS, ANXIOUS, OR ON EDGE: MORE THAN HALF THE DAYS
5. BEING SO RESTLESS THAT IT IS HARD TO SIT STILL: NEARLY EVERY DAY
6. BECOMING EASILY ANNOYED OR IRRITABLE: NEARLY EVERY DAY
4. TROUBLE RELAXING: MORE THAN HALF THE DAYS
GAD7 TOTAL SCORE: 15
3. WORRYING TOO MUCH ABOUT DIFFERENT THINGS: SEVERAL DAYS
7. FEELING AFRAID AS IF SOMETHING AWFUL MIGHT HAPPEN: NEARLY EVERY DAY

## 2025-03-17 ASSESSMENT — PATIENT HEALTH QUESTIONNAIRE - PHQ9: SUM OF ALL RESPONSES TO PHQ QUESTIONS 1-9: 17

## 2025-03-17 NOTE — PROGRESS NOTES
Attestation:   Dr. Kwok - Medical Director - Provides oversight and supervision of care.    Individual Session Summary   START TIME: 1:30PM   END TIME: 2:54 PM   Duration: 1 Hour 24 minutes    Data:  Met with client on this date for an individual session. The session focused on completing service initiation and orientation to IOP for co-occurring disorders. She completed an informal payoff matrix and decided to begin IOP Wednesday, 3/19/2025.     Intervention: Completed service initiation, including orientation to IOP for CODs. Motivational Interviewing to assess for stage of change and to increase internal motivation for change by normalizing difficulty abstaining and orienting to IOP group therapy. Person-centered, strengths-based, rapport-building to enhance therapeutic alliance and support treatment engagement. Provided information to therapist she had seen before and to pain clinic that takes medicare. Encouraged follow up with cardiology, if recommended.    Assessment: Stage of change: Preparation.   Zeny reported date of last use as 3.3.2024 (meth one day-overall two months clean). She reportted medical prescription for medical marijuana. She identified motivation and reasons to be in recovery including improving heart health and hoping to be a grandmother. She identified strengths including willingness to re-engage with therapy, starting group and looking forward, spending time with her dog and seeing her daughter get  in [place] in April.\She identified challenges as well, including PTSD with agoraphobia.and difficulty abstaining.

## 2025-03-18 ENCOUNTER — VIRTUAL VISIT (OUTPATIENT)
Dept: BEHAVIORAL HEALTH | Facility: CLINIC | Age: 50
End: 2025-03-18
Payer: MEDICARE

## 2025-03-18 ENCOUNTER — TELEPHONE (OUTPATIENT)
Dept: BEHAVIORAL HEALTH | Facility: CLINIC | Age: 50
End: 2025-03-18

## 2025-03-18 DIAGNOSIS — F15.20 METHAMPHETAMINE USE DISORDER, SEVERE (H): Primary | ICD-10-CM

## 2025-03-18 DIAGNOSIS — G47.00 INSOMNIA, UNSPECIFIED TYPE: ICD-10-CM

## 2025-03-18 DIAGNOSIS — F19.20 CHEMICAL DEPENDENCY (H): ICD-10-CM

## 2025-03-18 DIAGNOSIS — F33.9 EPISODE OF RECURRENT MAJOR DEPRESSIVE DISORDER, UNSPECIFIED DEPRESSION EPISODE SEVERITY: ICD-10-CM

## 2025-03-18 PROCEDURE — 98002 SYNCH AUDIO-VIDEO NEW MOD 45: CPT | Performed by: FAMILY MEDICINE

## 2025-03-18 NOTE — TELEPHONE ENCOUNTER
----- Message from Isabel Alexander sent at 3/17/2025  3:37 PM CDT -----  Regarding: Scheduling Phase 1- ROIs sent to HIM  Scheduling Request    Patient Name: WILBER QUIÑONEZ [[id number]]  Location of programming: [[id number]]   Start Date: March 19, 2025  [facility], Wednesday, and Thursday 9:00 AM to 12:00 PM  Attending Provider (MD): RICKY Kwok  Number of visits to be scheduled: 20  Duration of Appointment in minutes: 180  Visit Type: in person    Additional notes: Start Phase 1 (ROIs sent to HIM by DUY Islas 2/26/2025)

## 2025-03-18 NOTE — PROGRESS NOTES
[facility] - Intensive Outpatient Program    ASSESSMENT/PLAN                                                      1. Methamphetamine use disorder, severe (H) (Primary)  Reviewed history.  Encouraged her to take medications (bupropion and naltrexone) as prescribed by Cecilia Ohara NP to help reduce cravings.  She will follow-up with Cecilia as scheduled on 3/26/25.  Discussed benefits of IOP programming and encouraged plan for programming.    - Urine Drug Screen Oxycodone Urine Qualitative [id number]; Standing  - Fentanyl Qualitative with Reflex to Quant Urine; Standing  - Ethyl Glucuronide Screen with Reflex to Confirmation, Urine; Standing    2. Episode of recurrent major depressive disorder, unspecified depression episode severity  Needs improvement.  Bupropion will likely improve depression as well.  Will benefit from IOP programming and individual counseling.      3. Insomnia, unspecified type  Needs improvement.  Anticipate that she will note improvement with sustained abstinence.       Return in about 2 months (around 5/17/2025) for for IOP recertification if needed.      Patient counseling completed today:  Discussed mechanism of action, potential risks/benefits/side effects of medications and other recommendations above.        Discussed importance of avoiding isolation, building a network of supportive relationships, avoiding people/places/things associated with past use to reduce risk of relapse; including motivational interviewing regarding psychosocial treatment for addiction.     Physician Certification of Medical Necessity    Attending physician: Karla Larson, DO    Admission Certification:  I certify the above-named patient would require partial hospitalization care if intensive outpatient services were not provided and that the patient requires such IOP services for a minimum of 9 hours per week. These services are provided under the care and supervision of a physician and under an  "individualized plan of treatment that is established and periodically reviewed by a physician in consultation with appropriate staff participating in the program.    From admission date: 3/18/2025 to 60th day: 5/17/25      Karla Larson,  on 3/18/2025 at 2:02 PM        SUBJECTIVE                                                      Video-Visit Details  Type of service:  Video Visit  Video Start Time: 2:05 PM  Video End Time: 2:28 PM  Originating Location (pt. Location): Home  Distant Location (provider location):  [place]   Platform used for Video Visit: Mitesh      CC/HPI:  Zeny Mantilla is a 50 year old female with PMHx of depression, anxiety, prolonged QT, and methamphetamine use disorder who presents for intensive outpatient programming certification.  Recently saw Cecilia Ohara NP at [facility] for evaluation of her methamphetamine use and was started on Wellbutrin and naltrexone.      Substance(s) of choice:  Methamphetamine - started using 10 years ago, use increased over time         Withdrawal symptoms - irritable, fatigue, lack of sleep - sober 2 months (\"slipped about 5-7 days ago\")  IV drug use - none  Previous MAT - prescribed naltrexone and bupropion on 2/13/25  Previous detox/treatment - none  Current recovery activities:  none  Longest period of sobriety - 9 months  Medical complications from substance use - none    Other Substance Use:  ALCOHOL:  none  OPIATES:   none  KRATOM: none  STIMULANTS (cocaine, methamphetamine, MDMA/ecstasy): no cocaine, methamphetamine as above  SEDATIVES/HYPNOTICS/ANXIOLYTICS (benzodiazepines, GHB, Ambien, phenobarbital):   only prescribed  CANNABIS:  medical card (anxiety)  HALLUCINOGENS/DISSOCIATIVES (acid, mushrooms, ketamine):   none   OTHER  (Gambling, shopping, eating disorder): none  NICOTINE: 1 ppd    Desire to quit:  yes         Infectious Disease Screening:  Hep C:  non-reactive (9/28/23)  HIV: non-reactive " (9/28/23)      [place] Prescription Drug Monitoring Program Reviewed:  Yes; as expected      PAST PSYCHIATRIC HISTORY:  Diagnoses- depression, anxiety/PTSD, agoraphobia  Suicide Attempts: No   Hospitalizations: No     Working on establishing with psychiatry.          2/13/2025     2:17 PM 2/26/2025     9:00 AM 3/17/2025     3:00 PM   PHQ   PHQ-9 Total Score 17  21 17   Q9: Thoughts of better off dead/self-harm past 2 weeks Not at all  Not at all Not at all       Proxy-reported       Social History  Living Situation/Housing Status: alone w/ dog and cat  Relationship status: Single  Children: 4 children, no grandkids  Support System:  kids  Employment status: not currently working  Legal Issues/Upcoming Court Date(s): probation ([place])      Medications:  Current Outpatient Medications   Medication Sig Dispense Refill    buPROPion (WELLBUTRIN XL) 150 MG 24 hr tablet Take 1 tablet (150 mg) by mouth every morning for 7 days, THEN 2 tablets (300 mg) every morning for 23 days. 53 tablet 0    clobetasol (TEMOVATE) 0.05 % external solution Apply topically daily as needed (itching).      clotrimazole (LOTRIMIN) 1 % external cream       ketoconazole (NIZORAL) 2 % external shampoo Apply topically daily as needed.      metoprolol succinate ER (TOPROL XL) 100 MG 24 hr tablet Take 1 tablet (100 mg) by mouth daily 30 tablet 0    metroNIDAZOLE (METROGEL) 0.75 % vaginal gel Place 1 Applicatorful vaginally daily.      naltrexone (DEPADE/REVIA) 50 MG tablet Take 1/2 tablet daily x 5 days then increase to 1 tablet daily. 30 tablet 0    polyethylene glycol (MIRALAX) 17 GM/Dose powder Take 17 g by mouth      ramelteon (ROZEREM) 8 MG tablet Take 1 tablet (8 mg) by mouth at bedtime. 15 tablet 0     No current facility-administered medications for this visit.       Allergies   Allergen Reactions    Oxycodone-Acetaminophen Other (See Comments) and Shortness Of Breath     Difficulty breathing , Abdominal Pain-Cramping, Nausea    Penicillins  Anaphylaxis     PN: LW Reaction: ANAPHYLAXIS    Sulfa (Sulfonamide Antibiotics) [Sulfa Antibiotics] Anaphylaxis    Codeine Other (See Comments)     Difficulty breathing , PN: LW Reaction: RESPIRATORY DISTRESS    Morphine Other (See Comments)     Difficulty breathing , PN: LW Reaction: RESPIRATORY DISTRESS    Sulfasalazine Unknown     PN: LW Reaction: RESPIRATORY DISTRESS    Vicodin [Hydrocodone-Acetaminophen] Other (See Comments)     Difficulty breathing        No past medical history on file.    Past Surgical History:   Procedure Laterality Date    LAPAROSCOPIC ASSISSTED TOTAL COLECTOMY W/ J-POUCH  1995       No family history on file.      REVIEW OF SYSTEMS:  Chronic urinary incontinence.  Intermittent leg pain.  No PCP.  Increased appetite.   Sleep variable.  Mood is irritable.      OBJECTIVE                                                      There were no vitals taken for this visit.    Physical Exam  Constitutional:       General: She is not in acute distress.     Appearance: Normal appearance. She is not ill-appearing or diaphoretic.   HENT:      Mouth/Throat:      Mouth: Mucous membranes are moist.   Eyes:      General: No scleral icterus.  Pulmonary:      Effort: Pulmonary effort is normal. No respiratory distress.   Skin:     Coloration: Skin is not jaundiced or pale.   Neurological:      General: No focal deficit present.      Mental Status: She is alert and oriented to person, place, and time.   Psychiatric:         Mood and Affect: Mood is anxious and depressed.         Behavior: Behavior normal.         Thought Content: Thought content normal.         Judgment: Judgment normal.         Labs:    No results found for this or any previous visit (from the past 720 hours).      Karla Lasron DO  [facility]  Intensive Outpatient Program

## 2025-03-19 ENCOUNTER — DOCUMENTATION ONLY (OUTPATIENT)
Dept: BEHAVIORAL HEALTH | Facility: CLINIC | Age: 50
End: 2025-03-19
Payer: MEDICARE

## 2025-03-19 ENCOUNTER — HOSPITAL ENCOUNTER (OUTPATIENT)
Dept: BEHAVIORAL HEALTH | Facility: CLINIC | Age: 50
Discharge: HOME | End: 2025-03-19
Attending: FAMILY MEDICINE
Payer: MEDICARE

## 2025-03-19 NOTE — PROGRESS NOTES
ABSENT NOTE:    Zeny Mantilla was absent from group 3/19/2025. This absence was not excused. This writer attempted to contact patient via e-mail:   Robbei Moura,  We missed you at group today. Is there anything I can do to help you attend? Let me know and I will keep you on the schedule for tomorrow (9am-12pm). Please, reach out if you need to.   Thanks,  Isabel    Patient is expected to be in group on 3/20/2025.     Isabel Alexander, [facility], [facility]  3/19/2025 , 12:22 PM

## 2025-03-19 NOTE — GROUP NOTE
Group Therapy Documentation    PATIENT'S NAME: Zeny Mantilla  MRN:   [id number]  :   1975  ACCT. NUMBER: [id number]  DATE OF SERVICE: 3/19/25  START TIME:  9:00 AM  END TIME: 12:00 PM  FACILITATOR(S): Isabel Alexander, JEANIE, DUY  TOPIC: BEH Group Therapy  Number of patients attending the group:  3  Group Length:  3 Hours  Dimensions addressed: 1, 3, 4, 5, and 6    Summary of Group / Topics Discussed:  Mindfulness Meditation: Practice and Process  Stress Management:  Clients were provided handout with definition of stress, what causes stress, and common warning signs of stress. Clients participated in discussion regarding the purpose of stress and stress response and how too much or too little can be problematic for health, safety, and/or motivation. Clients reviewed the stress bucket and completed their own. The circled each symptom on the warning sign list categorized by physical, behavioral, and psychological stress to build awareness of individualized stress responses. Clients shared their stress experience and identified ways of reducing stress to improve overall wellness and relaxation.     Group Objectives/Goals:  Client will gain understanding of stress and the positive and negative effects on the mind and body  Client will identify ways to detect stress warning signs for too much or not enough stress specific to him or her  Client will identify ways to modify stress to boost motivation or decrease tension    Attestation:   Dr. Kwok - Medical Director - Provides oversight and supervision of care.      Group Attendance:  {Group Attendance:799131}    Patient's response to the group topic/interactions:  {OPBEHCLIENTRESPONSE:679040}    Patient appeared to be {Engagement:[id number]}.        Client specific details:  ***.

## 2025-03-20 ENCOUNTER — DOCUMENTATION ONLY (OUTPATIENT)
Dept: BEHAVIORAL HEALTH | Facility: CLINIC | Age: 50
End: 2025-03-20
Payer: MEDICARE

## 2025-03-20 NOTE — PROGRESS NOTES
ABSENT NOTE:    Zeny Mantilla was absent from group 3/20/2025 . This absence was excused due to Emergency vet visit to hospital previous night and caring for sick dog today. Zeny reached out to writer last night, prior to the start of group to inform. Patient is expected to be in group on 03/24/2025.     Isabel Alexander, [facility], [facility]  3/20/2025 , 9:52 AM

## 2025-03-24 ENCOUNTER — HOSPITAL ENCOUNTER (OUTPATIENT)
Dept: BEHAVIORAL HEALTH | Facility: CLINIC | Age: 50
Discharge: HOME | End: 2025-03-24
Attending: FAMILY MEDICINE
Payer: MEDICARE

## 2025-03-24 DIAGNOSIS — F15.20 METHAMPHETAMINE USE DISORDER, SEVERE (H): Primary | ICD-10-CM

## 2025-03-24 PROCEDURE — H2035 A/D TX PROGRAM, PER HOUR: HCPCS | Mod: HQ

## 2025-03-24 NOTE — GROUP NOTE
Group Therapy Documentation    PATIENT'S NAME: Zeny Mantilla  MRN:   [id number]  :   1975  ACCT. NUMBER: [id number]  DATE OF SERVICE: 3/24/25  START TIME:  9:00 AM  END TIME: 12:00 PM  FACILITATOR(S): Isabel Alexander, REBAC, DUY  TOPIC: BEH Group Therapy  Number of patients attending the group:  4  Group Length:  3 Hours    Dimensions addressed: 1, 2, 3, 4, 5, and 6    Summary of Group / Topics Discussed:    Community Group/Weekly Check-in:  Client completed weekly check-in card going over their weekend which including emotions, safety concerns, substance use, treatment interfering behaviors, and use of CBT & DBT skills. Clients will have the opportunity to discuss concerns impacting their treatment environment and any community announcements. Clients will identify if they would like to process in group or process individually with staff. Community group provides a safe space for clients to address any issues impacting their treatment environment.    Client Session Goals / Objectives:  Client will increase awareness of emotions and ability to identify them  Client will report substance use and safety concerns   Client will increase use of CBT/DBT skills  Client to process and discuss and process their weekend and connecting any barriers they encountered and skills they utilized.     Attestation:   Dr. Kwok - Medical Director - Provides oversight and supervision of care.     Group Attendance:  Attended group session    Patient's response to the group topic/interactions:  cooperative with task, discussed personal experience with topic, expressed readiness to alter behaviors, expressed understanding of topic, and listened actively    Patient appeared to be Actively Aking    Client specific details:       3/24/2025     4:00 PM   Suicide Ideation Check In   Since last session, how often have you had suicidal thoughts? No thoughts of suicide      Today was Zeny's first day of group. She introduced herself  and shared about her motivation to attend treatment: internal (personal desire to change) and external (probation). She expressed a desire to learin how to live life sober after a using llifestyle for 10 years. She talked about her 4 grown children and her dog. She shared that she talked to a half-sister for the first time in 11 years and that she is a good sober support person.      .

## 2025-03-25 ENCOUNTER — TELEPHONE (OUTPATIENT)
Dept: BEHAVIORAL HEALTH | Facility: CLINIC | Age: 50
End: 2025-03-25
Payer: MEDICARE

## 2025-03-25 NOTE — TELEPHONE ENCOUNTER
----- Message from Isabel Alexander sent at 3/24/2025  5:38 PM CDT -----  Regarding: Individual Sessions request (IOP)  Patient Name:  WILBER QUIÑONEZ [[id number]]  Location of programming: [facility]  Date: Monday March 31, 2025  Time: 12:15p m  Individual Sessions For OP Group: (MI/ME-Hg-Ictbdjwrn Disorders IOP)  Provider: Wilber Kwok MD  Number of visits to be scheduled: 1  Length/Duration of Appointment in minutes: 90  Visit Type: In person  Additional notes:

## 2025-03-26 ENCOUNTER — DOCUMENTATION ONLY (OUTPATIENT)
Dept: BEHAVIORAL HEALTH | Facility: CLINIC | Age: 50
End: 2025-03-26

## 2025-03-26 ENCOUNTER — HOSPITAL ENCOUNTER (OUTPATIENT)
Dept: BEHAVIORAL HEALTH | Facility: CLINIC | Age: 50
Discharge: HOME | End: 2025-03-26
Attending: FAMILY MEDICINE
Payer: MEDICARE

## 2025-03-26 DIAGNOSIS — F15.20 METHAMPHETAMINE USE DISORDER, SEVERE (H): Primary | ICD-10-CM

## 2025-03-26 DIAGNOSIS — F33.9 EPISODE OF RECURRENT MAJOR DEPRESSIVE DISORDER, UNSPECIFIED DEPRESSION EPISODE SEVERITY: ICD-10-CM

## 2025-03-26 PROCEDURE — H2035 A/D TX PROGRAM, PER HOUR: HCPCS | Mod: HQ

## 2025-03-26 NOTE — PROGRESS NOTES
Addiction Outpatient Weekly Clinical Staffing     Zeny Mantilla was staffed on 3/26/2025 . Zeny Mantilla was staffed on recovery strengths, barriers and treatment progress.     Staff present: Lacy Kessler [facility], Heather Lucas [facility], Emelyn Stevens [facility], Donald Welander, [facility], Cristiana Castellon [facility], and Donny Harding [facility]     Date: 3/26/2025 Time: 3:22 PM    Staff Signature: Isabel Alexander [facility], [facility]

## 2025-03-27 ENCOUNTER — DOCUMENTATION ONLY (OUTPATIENT)
Dept: BEHAVIORAL HEALTH | Facility: CLINIC | Age: 50
End: 2025-03-27
Payer: MEDICARE

## 2025-03-27 ENCOUNTER — HOSPITAL ENCOUNTER (OUTPATIENT)
Dept: BEHAVIORAL HEALTH | Facility: CLINIC | Age: 50
Discharge: HOME | End: 2025-03-27
Attending: FAMILY MEDICINE
Payer: MEDICARE

## 2025-03-27 NOTE — PROGRESS NOTES
ABSENT NOTE:    Zeny Mantilla was absent from group 3/27/2025 . This absence was not excused. This writer attempted to contact patient via phone and LM encouraging attendance and offering to touch base to ensure this group is a good fit/she feels safe. Patient is expected to be in group on 3/31/2025.     Isabel Alexander, [facility], [facility]  3/27/2025 , 10:48 AM

## 2025-03-27 NOTE — PROGRESS NOTES
Zeny did reach out during group to say she is working on her car for next week and did oversleep today. She offered to come to group late but writer didn't see message till after group ended.   Isabel [facility], [facility], [facility] on 3/27/2025 at 2:02 PM

## 2025-03-31 ENCOUNTER — HOSPITAL ENCOUNTER (OUTPATIENT)
Dept: BEHAVIORAL HEALTH | Facility: CLINIC | Age: 50
Discharge: HOME | End: 2025-03-31
Attending: FAMILY MEDICINE
Payer: COMMERCIAL

## 2025-03-31 ENCOUNTER — TELEPHONE (OUTPATIENT)
Dept: BEHAVIORAL HEALTH | Facility: CLINIC | Age: 50
End: 2025-03-31
Payer: COMMERCIAL

## 2025-03-31 DIAGNOSIS — F15.20 METHAMPHETAMINE USE DISORDER, SEVERE (H): Primary | ICD-10-CM

## 2025-03-31 DIAGNOSIS — F33.9 EPISODE OF RECURRENT MAJOR DEPRESSIVE DISORDER, UNSPECIFIED DEPRESSION EPISODE SEVERITY: ICD-10-CM

## 2025-03-31 PROCEDURE — H2035 A/D TX PROGRAM, PER HOUR: HCPCS | Mod: HQ

## 2025-03-31 NOTE — TELEPHONE ENCOUNTER
----- Message from Isabel Alexander sent at 3/31/2025  2:49 PM CDT -----  Regarding: Individual Sessions request (IOP)  Patient Name:  WILBER QUIÑONEZ [[id number]]  Location of programming: [facility]  Start Date: April 7, 2925  Day/s of week: Mondays  Time: 12:15pm  Individual Sessions For OP Group: (MI/YP-Se-Gyqjknstg Disorders IOP)  Provider: Wilber Kwok MD  Number of visits to be scheduled: 4  Length/Duration of Appointment in minutes: 60  Visit Type: In person  Additional notes:

## 2025-03-31 NOTE — GROUP NOTE
Group Therapy Documentation    PATIENT'S NAME: Zeny Mantilla  MRN:   [id number]  :   1975  ACCT. NUMBER: [id number]  DATE OF SERVICE: 3/31/25  START TIME:  9:00 AM  END TIME: 12:00 PM  FACILITATOR(S): Isabel Alexander, JEANIE, DUY  TOPIC: BEH Group Therapy  Number of patients attending the group:  5  Group Length:  3 Hours  Dimensions addressed: 1, 2, 3, 4, 5, and 6    Summary of Group / Topics Discussed:  Meditation practice and process:   Process Group: Facilitated a process group surrounding client's experiences with substance use, mental health, and recovery. Provided clients the space and structure to engage in the process and allow client's to engage with their peers. Provided client's with support and insight to and various skills to utilize to address their concerns and how to promote recovery. Provided clients space to process their various treatment assignments and reciveve feedback from their peers regarding their assignments.      Objective(s):   Process group allows clients who abuse substances and have mental health symptoms to share in their recovery with others.  Client's will have the opportunity to problem solve with peers and learn and implement valuable coping skills for relapse prevention and daily life issues.   Client will engage in a discussion to develop insight on their substance use, mental health, and recovery.  Client will have the opportunity to learn from their peers regarding their recovery and mental health journey.   Client will identify what they learned and gain insight into their use and mental health   Client will process their treatment assignments and process what they learned from the assignment.     Expected therapeutic outcomes:    Build recovery resilience against cravings, leading to resilience against relapse in early sobriety.   Continue to build motivation to be vulnerable, honest, and building trust.  Growth in developing optimal environment and in managing  "difficult emotions  Build recovery resilience against cravings, leading to resilience against relapse in early sobriety.     .Attestation:   Dr. Kwok - Medical Director - Provides oversight and supervision of care.    Group Attendance:  Attended group session    Patient's response to the group topic/interactions:  cooperative with task, discussed personal experience with topic, expressed readiness to alter behaviors, expressed understanding of topic, listened actively, and requested more information about topic    Patient appeared to be Actively participating, Attentive, and Engaged.        Client specific details:        3/31/2025    10:00 AM   Suicide Ideation Check In   Since last session, how often have you had suicidal thoughts? No thoughts of suicide     Zeny reported continuing abstinence with some thoughts, urges and  cravings. Identified trigger(s):  corner of her bedroom where she used.  Reported use of coping/relapse prevention skills/practices utilized: Working on \"my corner\" to change it around. She was receptive to other ideas to help get through urges.  She stated, It \"It feels good to work on the corner\". Walking dog (extra long)  Physical/dental health concerns: She repored that her legs were hurting and she took gabapentin, but felt drunk (Side effect reported) and overslept last Thursday.  Medical/dental appointments this/next week: LINDSAY figueroa this IOP moved to next Monday after group due to car repair needed.  Medication adherence: started using medi-set (weekly AM/HS) and reported that she forgot it this momroing. She was receptive to further tailoring to continue improving medication adherence.   Identified feelings (excited, nervous about boarding my dog for my duaghter's wedding), reported current SUDS level (50), and verbalized an affirmation.  She requested more information on decision-making.     "

## 2025-04-02 ENCOUNTER — HOSPITAL ENCOUNTER (OUTPATIENT)
Dept: BEHAVIORAL HEALTH | Facility: CLINIC | Age: 50
Discharge: HOME | End: 2025-04-02
Attending: FAMILY MEDICINE
Payer: COMMERCIAL

## 2025-04-02 DIAGNOSIS — F33.9 EPISODE OF RECURRENT MAJOR DEPRESSIVE DISORDER, UNSPECIFIED DEPRESSION EPISODE SEVERITY: ICD-10-CM

## 2025-04-02 DIAGNOSIS — F15.20 METHAMPHETAMINE USE DISORDER, SEVERE (H): Primary | ICD-10-CM

## 2025-04-02 PROCEDURE — H2035 A/D TX PROGRAM, PER HOUR: HCPCS | Mod: HQ

## 2025-04-02 NOTE — GROUP NOTE
Group Therapy Documentation    PATIENT'S NAME: Zeny Mantilla  MRN:   [id number]  :   1975  ACCT. NUMBER: [id number]  DATE OF SERVICE: 25  START TIME:  9:00 AM  END TIME: 12:00 PM  FACILITATOR(S): Isabel Alexander, [facility], [facility]; [facility]  TOPIC: BEH Group Therapy  Number of patients attending the group:  4  Group Length:  3 Hours  Dimensions addressed: 1, 3, 4, 5, and 6    Summary of Group / Topics Discussed:  Meditation: Practice and process.  Stress and Anxiety: Watched video educating on anxiety, and skills building (e.g., self-talk, observing thoughts).  Process Group: Facilitated a process group surrounding client's experiences with substance use, mental health, and recovery. Provided clients the space and structure to engage in the process and allow client's to engage with their peers. Provided client's with support and insight to and various skills to utilize to address their concerns and how to promote recovery. Provided clients space to process their various treatment assignments and reciveve feedback from their peers regarding their assignments.      Objective(s):   Process group allows clients who abuse substances and have mental health symptoms to share in their recovery with others.  Client's will have the opportunity to problem solve with peers and learn and implement valuable coping skills for relapse prevention and daily life issues.   Client will engage in a discussion to develop insight on their substance use, mental health, and recovery.  Client will have the opportunity to learn from their peers regarding their recovery and mental health journey.   Client will identify what they learned and gain insight into their use and mental health   Client will process any treatment assignments and process what they learned from the assignment.     Expected therapeutic outcomes:    Build recovery resilience against cravings, leading to resilience against relapse in early sobriety.  "  Continue to build motivation to be vulnerable, honest, and building trust.  Growth in developing optimal environment and in managing difficult emotions  Acquire knowledge and practice skills to reduce anxiety, stress and PTSD symptoms.    Attestation:   Dr. Kwok - Medical Director - Provides oversight and supervision of care.    Group Attendance:  Attended group session    Patient's response to the group topic/interactions:  cooperative with task    Patient appeared to be Actively participating, Attentive, and Engaged.        Wednesday Client specific details:        4/2/2025     9:00 AM   Suicide Ideation Check In   Since last session, how often have you had suicidal thoughts? No thoughts of suicide     Zeny reported continuing abstinence with some thoughts, and no urges or cravings. Identified trigger(s):  Her using corner. Reported use of coping/relapse prevention skills/practices utilized: moving furniture and changing the corner. She discussed her ambivalence and asked about how normal it is to feel that way. She expressed reluctance to get rid of paraphernalia and was very open to ideas to help her change process (e.g., write a \"Goodbye letter to the substance\"; move it out of sight, if you hang around a cotto shop, you will eventually get a haircut). She was supported for her honesty and encouraged to keep talking about it in this group.   She reported her sleep is difficult and shared about sleep patterns before use and willingness to take medication at 10:00-10:30pm for two weeks after identifying she is safe. She did share that her dog barks at night and this triggers her hypervigilance. She was also given an Event Planning Sheet to prepare for her daughter's wedding. She reported that she got her car fixed.  Zeny expressed gratitude for waking up and being able to be here.  Identified feelings (optimistic, looking forward to wedding-daughter's in [place], seeing family), reported current SUDS level, " and verbalized an affirmation I am getting sober longer than .

## 2025-04-03 ENCOUNTER — HOSPITAL ENCOUNTER (OUTPATIENT)
Dept: BEHAVIORAL HEALTH | Facility: CLINIC | Age: 50
Discharge: HOME | End: 2025-04-03
Attending: FAMILY MEDICINE
Payer: COMMERCIAL

## 2025-04-03 DIAGNOSIS — F15.20 METHAMPHETAMINE USE DISORDER, SEVERE (H): Primary | ICD-10-CM

## 2025-04-03 DIAGNOSIS — F33.9 EPISODE OF RECURRENT MAJOR DEPRESSIVE DISORDER, UNSPECIFIED DEPRESSION EPISODE SEVERITY: ICD-10-CM

## 2025-04-03 PROCEDURE — H2035 A/D TX PROGRAM, PER HOUR: HCPCS | Mod: HQ

## 2025-04-03 NOTE — GROUP NOTE
"Group Therapy Documentation    PATIENT'S NAME: Zeny Mantilla  MRN:   [id number]  :   1975  ACCT. NUMBER: [id number]  DATE OF SERVICE: 25  START TIME:  9:00 AM  END TIME: 12:00 PM  FACILITATOR(S): Isabel Alexander, JEANIE, DUY  TOPIC: BEH Group Therapy  Number of patients attending the group:  3  Group Length:  3 Hours  Dimensions addressed: 1, 2, 3, 4, 5, and 6    Summary of Group / Topics Discussed:  Weekend Planning:   Clients completed a weekend planning worksheet and shared with the group what their weekend plans are. Engaged with clients about high risks situations they may be encountering of the weekend and various tools and ways they will cope to maintain continued sobriety. Clients identified what activities they will do each day, who their supporters are, and what skills they can use if they have a crisis.  Clients were taught how this process relates to the \"cope ahead\" DBT skill and can help reduce anxiety and stress.     Client Session Goals / Objectives:  Develop a weekend safety plan  Identify sober supports in the home environment  Identify ways they can stay busy and occupy time  to minimize the risk of relapse for over the weekend.  Attestation:   Dr. Kwok - Medical Director - Provides oversight and supervision of care.     Group Attendance:  Attended group session  Patient's response to the group topic/interactions:  cooperative with task, discussed personal experience with topic, expressed readiness to alter behaviors, expressed understanding of topic, listened actively, and requested more information about topic  Patient appeared to be Actively participating, Attentive, and Engaged.        Thursday Client specific details:       4/3/2025     3:00 PM   Suicide Ideation Check In   Since last session, how often have you had suicidal thoughts? No thoughts of suicide     Zeny reported continuing abstinence with some thoughts, urges and cravings. Triggers identified: \"the corner of my " "bedroom, my house, people who know where I live\".  Coping skills used: self-talk, \"I don't have time for that right now\".   Engaged with others and the material presented as evidenced by weekend planning to reduce risk of return to use: taking niece and nephew shopping. Recovery related activity I will do today: Take the dog to get familiar with kennel he'll go to for my daughter's wedding in [place] 4/15-4/23/2025.  She reported that she put the paraphernalia out of sight, plans to write a goodbye letter this weekend and is considering turning it over to a friend who will get rid of it (safely) before leaving 4/15/2025.    "
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
## 2025-04-07 ENCOUNTER — HOSPITAL ENCOUNTER (OUTPATIENT)
Dept: BEHAVIORAL HEALTH | Facility: CLINIC | Age: 50
Discharge: HOME | End: 2025-04-07
Attending: FAMILY MEDICINE
Payer: COMMERCIAL

## 2025-04-07 DIAGNOSIS — G47.00 INSOMNIA, UNSPECIFIED TYPE: ICD-10-CM

## 2025-04-07 DIAGNOSIS — F15.20 METHAMPHETAMINE USE DISORDER, SEVERE (H): Primary | ICD-10-CM

## 2025-04-07 DIAGNOSIS — F33.9 EPISODE OF RECURRENT MAJOR DEPRESSIVE DISORDER, UNSPECIFIED DEPRESSION EPISODE SEVERITY: ICD-10-CM

## 2025-04-07 PROCEDURE — H2035 A/D TX PROGRAM, PER HOUR: HCPCS

## 2025-04-07 PROCEDURE — H2035 A/D TX PROGRAM, PER HOUR: HCPCS | Mod: HQ

## 2025-04-07 ASSESSMENT — ANXIETY QUESTIONNAIRES
IF YOU CHECKED OFF ANY PROBLEMS ON THIS QUESTIONNAIRE, HOW DIFFICULT HAVE THESE PROBLEMS MADE IT FOR YOU TO DO YOUR WORK, TAKE CARE OF THINGS AT HOME, OR GET ALONG WITH OTHER PEOPLE: VERY DIFFICULT
1. FEELING NERVOUS, ANXIOUS, OR ON EDGE: NEARLY EVERY DAY
GAD7 TOTAL SCORE: 16
6. BECOMING EASILY ANNOYED OR IRRITABLE: NEARLY EVERY DAY
7. FEELING AFRAID AS IF SOMETHING AWFUL MIGHT HAPPEN: NEARLY EVERY DAY
3. WORRYING TOO MUCH ABOUT DIFFERENT THINGS: SEVERAL DAYS
GAD7 TOTAL SCORE: 16
5. BEING SO RESTLESS THAT IT IS HARD TO SIT STILL: MORE THAN HALF THE DAYS
4. TROUBLE RELAXING: NEARLY EVERY DAY
2. NOT BEING ABLE TO STOP OR CONTROL WORRYING: SEVERAL DAYS

## 2025-04-07 ASSESSMENT — PATIENT HEALTH QUESTIONNAIRE - PHQ9: SUM OF ALL RESPONSES TO PHQ QUESTIONS 1-9: 14

## 2025-04-07 NOTE — GROUP NOTE
Group Therapy Documentation    PATIENT'S NAME: Zeny Mantilla  MRN:   [id number]  :   1975  ACCT. NUMBER: [id number]  DATE OF SERVICE: 25  START TIME:  9:00 AM  END TIME: 12:00 PM  FACILITATOR(S): Isabel Alexander, REBAC, LADC  TOPIC: BEH Group Therapy  Number of patients attending the group:  3  Group Length:  3 Hours  Dimensions addressed: 1, 2, 3, 4, 5, and 6    Summary of Group / Topics Discussed:   Emotional Agility: Saida Mueller Video (SANDY Talk) re: how strong emotions can provide data about what is important and allowing them to guide us in values-driven actions.    Cognitive Restructuring/Re-framing:  Client s practiced identifying thoughts and feelings and processed how to slow down (meditation helps) and catch, check and change it/re-frame it to be helpful and effective. eceived an overview of how to identify common cognitive distortions. Patients will explore alternatives to cognitive distortions and practice challenging their negative thought patterns. The goal is to help patients use feelings to guide healthy/meaningful actions in recovery.    Client Session Goals / Objectives:  Familiarized self with how to re-frame strong feelings.   Explored impact of strong feelings on mood and activity  Shared examples of how strong emotions can guide us in values-driven actions in recovery.   Practiced and plan to apply in daily life.  Attestation:   Dr. Kwok - Medical Director - Provides oversight and supervision of care.  Group Attendance:  Attended group session    Patient's response to the group topic/interactions:  confronted peers appropriately    Patient appeared to be Actively participating, Attentive, and Engaged.         Client specific details:  Zeny reported continuing abstinence with some thoughts, urges  and no cravings. Identified trigger(s):  house, room, corner where use was common.  Reported use of coping/relapse prevention skills/practices utilized: worked on corner. Went  shira after group on Thursday at the [facility] and saw a doctor, as suggested by writer when she asked if this clinic/program could refill her blood pressure medication. She reported that they zaida blood and provided prescription for blood pressure medications for one year. She expressed concern about her cholesterol and expressed an interested to eat healthier, as he daughter is, noting that she goes to a gym daily, is eating well and losing weight/feeling better. She reported that she does eat breakfast daily and is taking her medications. She expressed some concerns about feeling groggy in the morning and agreed to consult with new doctor about gabapentin for pain. She verbalized a commitment to contact Marvin, her previous therapist and we will meet after group today for DA.    Identified feelings (optimistic), reported current SUDS level (30), and verbalized an affirmation.

## 2025-04-09 ENCOUNTER — HOSPITAL ENCOUNTER (OUTPATIENT)
Dept: BEHAVIORAL HEALTH | Facility: CLINIC | Age: 50
Discharge: HOME | End: 2025-04-09
Attending: FAMILY MEDICINE
Payer: COMMERCIAL

## 2025-04-09 ENCOUNTER — DOCUMENTATION ONLY (OUTPATIENT)
Dept: BEHAVIORAL HEALTH | Facility: CLINIC | Age: 50
End: 2025-04-09
Payer: COMMERCIAL

## 2025-04-09 DIAGNOSIS — F15.20 METHAMPHETAMINE USE DISORDER, SEVERE (H): ICD-10-CM

## 2025-04-09 DIAGNOSIS — F32.1 MAJOR DEPRESSIVE DISORDER, SINGLE EPISODE, MODERATE (H): Primary | ICD-10-CM

## 2025-04-09 DIAGNOSIS — F43.10 PTSD (POST-TRAUMATIC STRESS DISORDER): ICD-10-CM

## 2025-04-09 PROCEDURE — H2035 A/D TX PROGRAM, PER HOUR: HCPCS | Mod: HQ

## 2025-04-09 PROCEDURE — H2035 A/D TX PROGRAM, PER HOUR: HCPCS

## 2025-04-09 NOTE — GROUP NOTE
Group Therapy Documentation    PATIENT'S NAME: Zeny Mantilla  MRN:   [id number]  :   1975  ACCT. NUMBER: [id number]  DATE OF SERVICE: 25  START TIME:  9:00 AM  END TIME: 12:00 PM  FACILITATOR(S): Donny Harding LADC  TOPIC: BEH Group Therapy  Number of patients attending the group:  4  Group Length:  3 Hours    Dimensions addressed: 1, 3, and 5    Summary of Group / Topics Discussed:      Communication: Communication Errors:  Client was provided handout including common communication errors including: sidetracking, kitchen sinking, bringing up the past, mind reading, character assassination, showing contempt, should statements, threatening/demanding, generalization, whining, and blaming. Client will identify how these communication patterns tend to be self-defeating and interfere with relationships, self-esteem/respect, or getting what they want. Each client identified communication errors commonly made by them with examples and explored ways to reframe their delivery in order to be more effective with achieving the desired outcome without impacting relationships or self-esteem.    Group Objectives/Goals  Client will understand common errors made in communication and why such communication patterns can be problematic     Client will be able to identify their most commonly used communication errors and ways to make adjustments while still achieving their communication goal  Attestation:   Dr. Kwok - Medical Director - Provides oversight and supervision of care.     Client will have time to share and practice their change in communication to increase their effectiveness when using in another environment         2025     4:00 PM   Suicide Ideation Check In   Since last session, how often have you had suicidal thoughts? No thoughts of suicide          Group Attendance:  Attended group session and Other - Pt updated treatment plan with JEANIE Nassar during the first hour of group and  joined for the last 2 hours    Patient's response to the group topic/interactions:  cooperative with task, discussed personal experience with topic, expressed readiness to alter behaviors, expressed understanding of topic, gave appropriate feedback to peers, and listened actively    Patient appeared to be Actively participating, Attentive, and Engaged.        Client specific details:   Client reported no change to last date of use. Client participated in group discussion on fair fighting rules. Client identified discussing one topic at a time, and taking breaks as something they would like to focus on in conflict going forward in his relationships. Client participated in check out, and declined additional time to process..

## 2025-04-10 ENCOUNTER — TELEPHONE (OUTPATIENT)
Dept: BEHAVIORAL HEALTH | Facility: CLINIC | Age: 50
End: 2025-04-10

## 2025-04-10 ENCOUNTER — HOSPITAL ENCOUNTER (OUTPATIENT)
Dept: BEHAVIORAL HEALTH | Facility: CLINIC | Age: 50
Discharge: HOME | End: 2025-04-10
Attending: FAMILY MEDICINE
Payer: COMMERCIAL

## 2025-04-10 DIAGNOSIS — F43.10 PTSD (POST-TRAUMATIC STRESS DISORDER): ICD-10-CM

## 2025-04-10 DIAGNOSIS — F15.20 METHAMPHETAMINE USE DISORDER, SEVERE (H): Primary | ICD-10-CM

## 2025-04-10 DIAGNOSIS — F33.9 EPISODE OF RECURRENT MAJOR DEPRESSIVE DISORDER, UNSPECIFIED DEPRESSION EPISODE SEVERITY: ICD-10-CM

## 2025-04-10 PROCEDURE — H2035 A/D TX PROGRAM, PER HOUR: HCPCS | Mod: HQ

## 2025-04-10 NOTE — GROUP NOTE
Group Therapy Documentation    PATIENT'S NAME: Zeny Mantilla  MRN:   [id number]  :   1975  ACCT. NUMBER: [id number]  DATE OF SERVICE: 4/10/25  START TIME:  9:00 AM  END TIME: 12:00 PM  FACILITATOR(S): Isabel Alexander, JEANIE, DUY  TOPIC: BEH Group Therapy  Number of patients attending the group:  3  Group Length:  3 Hours    Dimensions addressed: 1, 2, 3, 4, 5, and 6    Summary of Group / Topics Discussed:    Nadia  Process Group: Facilitated a process group surrounding client's experiences with substance use, mental health, and recovery. Provided clients the space and structure to engage in the process and allow client's to engage with their peers. Provided client's with support and insight to and various skills to utilize to address their concerns and how to promote recovery. Provided clients space to process their various treatment assignments and reciveve feedback from their peers regarding their assignments.      Objective(s):   Process group allows clients who abuse substances and have mental health symptoms to share in their recovery with others.  Client's will have the opportunity to problem solve with peers and learn and implement valuable coping skills for relapse prevention and daily life issues.   Client will engage in a discussion to develop insight on their substance use, mental health, and recovery.  Client will have the opportunity to learn from their peers regarding their recovery and mental health journey.   Client will identify what they learned and gain insight into their use and mental health   Client will process their treatment assignments and process what they learned from the assignment.     Expected therapeutic outcomes:    Build recovery resilience against cravings, leading to resilience against relapse in early sobriety.   Continue to build motivation to be vulnerable, honest, and building trust.  Growth in developing optimal environment and in managing difficult  emotions  Build recovery resilience against cravings, leading to resilience against relapse in early sobriety.   Attestation:   Dr. Kwok - Medical Director - Provides oversight and supervision of care.        Group Attendance:  {Group Attendance:[id number]}    Patient's response to the group topic/interactions:  {OPBEHCLIENTRESPONSE:745773}    Patient appeared to be {Engagement:[id number]}.        Client specific details:  ***.

## 2025-04-10 NOTE — TELEPHONE ENCOUNTER
----- Message from Isabel Alexander sent at 4/10/2025  2:51 PM CDT -----  Regarding: Individual Sessions request (IOP)  Patient Name:  WILBER QUIÑONEZ [[id number]]  Location of programming: [facility]  Date: 4/9/2025  Day/s of week: Wednesday  Time: 9:00am  Individual Sessions For OP Group: ([facility] IOP)  Provider: Wilbre Kwok MD  Number of visits to be scheduled: 1  Length/Duration of Appointment in minutes: 60  Visit Type: In person  Additional notes:

## 2025-04-10 NOTE — ADDENDUM NOTE
Encounter addended by: Donny Harding [facility] on: 4/10/2025 8:08 AM   Actions taken: Charge Capture section accepted

## 2025-04-10 NOTE — GROUP NOTE
Group Therapy Documentation    PATIENT'S NAME: Zeny Mantilla  MRN:   [id number]  :   1975  ACCT. NUMBER: [id number]  DATE OF SERVICE: 4/10/25  START TIME:  9:00 AM  END TIME: 12:00 PM  FACILITATOR(S): Isabel Alexander, JEANIE, DUY  TOPIC: BEH Group Therapy  Number of patients attending the group:  3  Group Length:  3 Hours    Dimensions addressed: 1, 2, 3, 4, 5, and 6    Summary of Group / Topics Discussed:    Process Group: Facilitated a process group surrounding client's experiences with substance use, mental health, and recovery. Provided clients the space and structure to engage in the process and allow client's to engage with their peers. Provided client's with support and insight to and various skills to utilize to address their concerns and how to promote recovery. Provided clients space to process their various treatment assignments and reciveve feedback from their peers regarding their assignments.      Objective(s):   Process group allows clients who abuse substances and have mental health symptoms to share in their recovery with others.  Client's will have the opportunity to problem solve with peers and learn and implement valuable coping skills for relapse prevention and daily life issues.   Client will engage in a discussion to develop insight on their substance use, mental health, and recovery.  Client will have the opportunity to learn from their peers regarding their recovery and mental health journey.   Client will identify what they learned and gain insight into their use and mental health   Client will process their treatment assignments and process what they learned from the assignment.     Expected therapeutic outcomes:    Build recovery resilience against cravings, leading to resilience against relapse in early sobriety.   Continue to build motivation to be vulnerable, honest, and building trust.  Growth in developing optimal environment and in managing difficult emotions  Build  recovery resilience against cravings, leading to resilience against relapse in early sobriety.     Group Attendance:  Attended group session    Patient's response to the group topic/interactions:  cooperative with task, discussed personal experience with topic, expressed readiness to alter behaviors, expressed understanding of topic, listened actively, and requested more information about topic    Patient appeared to be Actively participating, Attentive, and Engaged.         Client specific details:   Zeny abstained since last group and reported experiencing thoughts, no urges and some cravings. Triggers identified: being at home and in bedroom. Coping skills used:  moved things from corner of bedroom-now no chairs-stool in corner,  table. Taken using stuff down and packed it up. She is considering writing a good-bye letter, while in [place] for daughter's wedding. She will let me know if she does/not get rid of paraphernalia as recommended. Zeny engaged with others and the material presented as evidenced by weekend planning/planning for trip (4/14-4/24/2025) to reduce risk of return to use: including discussion noted above and considering attending NA (virtually or in person).   She expressed gratitude for this group and feeling good today. Sleeping and feeling awake. Iagreed to work on Event Planning sheet, keep some sleep hygiene habits going (getting up in AM).  Ideas from group: Catch some sunrises. Sunrise boat ride, [place].   SUDS level about trip: 40s. Biggest concern is pet care.   Identified feelings and shared an aftirmation.

## 2025-04-10 NOTE — ADDENDUM NOTE
Encounter addended by: Isabel Alexander, REBAC, LADC on: 4/10/2025 2:54 PM   Actions taken: Clinical Note Signed

## 2025-04-10 NOTE — ADDENDUM NOTE
Encounter addended by: Welander, Donald, LADC on: 4/10/2025 2:41 PM   Actions taken: Charge Capture section accepted

## 2025-04-10 NOTE — ADDENDUM NOTE
Encounter addended by: Isabel Alexander, REBAC, LADC on: 4/10/2025 12:34 PM   Actions taken: Clinical Note Signed

## 2025-04-14 ENCOUNTER — DOCUMENTATION ONLY (OUTPATIENT)
Dept: BEHAVIORAL HEALTH | Facility: CLINIC | Age: 50
End: 2025-04-14
Payer: COMMERCIAL

## 2025-04-16 ENCOUNTER — DOCUMENTATION ONLY (OUTPATIENT)
Dept: BEHAVIORAL HEALTH | Facility: CLINIC | Age: 50
End: 2025-04-16
Payer: COMMERCIAL

## 2025-04-16 NOTE — PROGRESS NOTES
ABSENT NOTE:    Zeny Mantilla was absent from group 4/16/2025 . This absence was excused. Excused till 4/24/2025 due to daughter's wedding out of state. Patient is expected to be in group on 4/28/2025.    Isabel Alexander, [facility], [facility]  4/16/2025 , 5:21 PM

## 2025-04-16 NOTE — PROGRESS NOTES
ABSENT NOTE:    Zeny Mantilla was absent from group 4/14/2025 . This absence was excused. Excused till 4/24/2025 due to daughter's wedding out of state. Patient is expected to be in group on 4/28/2025.    Isabel Alexander, [facility]  4/16/2025 , 5:21 PM

## 2025-04-17 ENCOUNTER — DOCUMENTATION ONLY (OUTPATIENT)
Dept: BEHAVIORAL HEALTH | Facility: CLINIC | Age: 50
End: 2025-04-17
Payer: COMMERCIAL

## 2025-04-18 NOTE — PROGRESS NOTES
ABSENT NOTE:    Zeny Mantilla was absent from group 4/16/2025 . This absence was excused. Excused till 4/24/2025 due to daughter's wedding out of state. Patient is expected to be in group on 4/28/2025.    Isabel Alexander, [facility], [facility]  4/16/2025 , 5:21 PM

## 2025-04-21 ENCOUNTER — DOCUMENTATION ONLY (OUTPATIENT)
Dept: BEHAVIORAL HEALTH | Facility: CLINIC | Age: 50
End: 2025-04-21
Payer: COMMERCIAL

## 2025-04-21 NOTE — PROGRESS NOTES
ABSENT NOTE:    Zeny Mantilla was absent from group 4/21/2025 . This absence was excused. Excused through 4/24/2025 due to daughter's wedding out of state. Patient is expected to be in group on 4/28/2025.    Isabel Alexander, [facility], [facility] on 4/21/2025 at 12:10 PM

## 2025-04-23 ENCOUNTER — DOCUMENTATION ONLY (OUTPATIENT)
Dept: BEHAVIORAL HEALTH | Facility: CLINIC | Age: 50
End: 2025-04-23
Payer: COMMERCIAL

## 2025-04-23 NOTE — PROGRESS NOTES
Addiction Outpatient Weekly Clinical Staffing     Zeny Mantilla was staffed on 4/23/2025 . Zeny Mantilla was staffed on recovery strengths, barriers and treatment progress.     Staff present: Lacy Kessler [facility], Heather Lucas [facility], Gisela Awan [facility] , Emelyn Stevens [facility], Donald Welander, [facility], Cristiana Castellon [facility], and Donny Harding [facility]     Date: 4/23/2025 Time: 3:05 PM    Staff Signature: Isabel Alexander, [facility], [facility]

## 2025-04-24 ENCOUNTER — DOCUMENTATION ONLY (OUTPATIENT)
Dept: BEHAVIORAL HEALTH | Facility: CLINIC | Age: 50
End: 2025-04-24
Payer: COMMERCIAL

## 2025-04-24 NOTE — PROGRESS NOTES
ABSENT NOTE:    Zeny Mantilla was absent from group 4/24/2025 . This absence was excused. Excused through 4/24/2025 due to daughter's wedding out of state. Patient is expected to be in group on 4/28/2025.    sIabel Alexander, [facility], [facility] on 4/24/2025 at 3:06 PM

## 2025-04-28 ENCOUNTER — HOSPITAL ENCOUNTER (OUTPATIENT)
Dept: BEHAVIORAL HEALTH | Facility: CLINIC | Age: 50
Discharge: HOME | End: 2025-04-28
Attending: FAMILY MEDICINE
Payer: COMMERCIAL

## 2025-04-28 DIAGNOSIS — F15.20 METHAMPHETAMINE USE DISORDER, SEVERE (H): Primary | ICD-10-CM

## 2025-04-28 DIAGNOSIS — F43.10 PTSD (POST-TRAUMATIC STRESS DISORDER): ICD-10-CM

## 2025-04-28 PROCEDURE — H2035 A/D TX PROGRAM, PER HOUR: HCPCS | Mod: HQ

## 2025-04-28 NOTE — GROUP NOTE
"Group Therapy Documentation    PATIENT'S NAME: Zeny Mantilla  MRN:   [id number]  :   1975  ACCT. NUMBER: [id number]  DATE OF SERVICE: 25  START TIME:  9:00 AM  END TIME: 12:00 PM  FACILITATOR(S): Isabel Alexander, JEANIE, DUY  TOPIC: BEH Group Therapy  Number of patients attending the group:  5  Group Length:  3 Hours  Dimensions addressed: 1, 2, 3, 4, 5, and 6    Summary of Group / Topics Discussed:  Introduction to DBT:   Provided a psychoeducation group on the premise of Dialectical Behavior Therapy. Provided a handout that reviewed the four sections of DBT: Interpersonal Effectiveness, Distress Tolerance, Mindfulness and Emotional Regulation. Provided education on [employer]. Reviewed the goals of DBT and how DBT can be a utilized therapeutic approach for addiction, recovery and mental health. Facilitated a group discussion amongst client's regarding their own experience with DBT and ways they can utilize DBT in their recovery.     Group Objectives/Goals  Client's will gain an understanding on the basics of DBT therapeutic model and how DBT can be utilized in their recovery and MH.    Attestation:   Dr. Kwok - Medical Director - Provides oversight and supervision of care.    Group Attendance:  Attended group session    Patient's response to the group topic/interactions:  cooperative with task, discussed personal experience with topic, expressed understanding of topic, and listened actively    Patient appeared to be Actively participating, Attentive, and Engaged.        Client specific details:       2025    10:00 AM   Suicide Ideation Check In   Since last session, how often have you had suicidal thoughts? No thoughts of suicide   Today was Zeny's first day back after a leave to go to her daughter's wedding in [place]. She reported feeing like one of the complainers on the trip. \"Despite the stress, it all worked out well.\"  Zeny reported continuing abstinence with thoughts, urges and " cravings. Identified trigger(s):  stress re: travel, worry about having dog in kennel, being with family, coming home to dog who is adjusting, corner not completed, yet, and getting news that the person she was charged with is getting out of long term, so needing to move by June to avoid them.   Reported use of coping/relapse prevention skills/practices utilized: was with family and away from contacts. Working on the corner. Start looking for a place. Get dog certified as an emotional support animal.  Physical/dental health concerns: Plans to schedule with PCP to discuss left side bothering her.  Plans for this week include writing a Good-bye letter to the substance and scheduling with previous individual therapist, Marvin [place]    Medical/dental appointments this/next week: TBS  Medication adherence: Taking meds. Using a medi-set Concerns/side effects: None reported Refills: up to date.  Identified feelings, reported current SUDS level (40), and verbalized an affirmation.  Attestation:   Dr. Kwok - Medical Director - Provides oversight and supervision of care.

## 2025-04-30 ENCOUNTER — DOCUMENTATION ONLY (OUTPATIENT)
Dept: BEHAVIORAL HEALTH | Facility: CLINIC | Age: 50
End: 2025-04-30
Payer: COMMERCIAL

## 2025-04-30 NOTE — PROGRESS NOTES
ABSENT NOTE:    Zeny FREDERICK Annalee was absent from group 4/30/2025. This absence was not excused. This writer attempted to contact patient via telephone and left a message. Patient is expected to be in group on 05/01/2025.     Isabel Alexander, [facility], [facility][note 2]  4/30/2025 , 1:50 PM

## 2025-05-01 ENCOUNTER — HOSPITAL ENCOUNTER (OUTPATIENT)
Dept: BEHAVIORAL HEALTH | Facility: CLINIC | Age: 50
Discharge: HOME | End: 2025-05-01
Attending: FAMILY MEDICINE
Payer: COMMERCIAL

## 2025-05-01 DIAGNOSIS — F32.1 MAJOR DEPRESSIVE DISORDER, SINGLE EPISODE, MODERATE (H): ICD-10-CM

## 2025-05-01 DIAGNOSIS — F15.20 METHAMPHETAMINE USE DISORDER, SEVERE (H): Primary | ICD-10-CM

## 2025-05-01 DIAGNOSIS — F43.10 PTSD (POST-TRAUMATIC STRESS DISORDER): ICD-10-CM

## 2025-05-01 PROCEDURE — H2035 A/D TX PROGRAM, PER HOUR: HCPCS | Mod: HQ

## 2025-05-01 NOTE — GROUP NOTE
"Group Therapy Documentation    PATIENT'S NAME: Zeyn Mantilla  MRN:   [id number]  :   1975  ACCT. NUMBER: [id number]  DATE OF SERVICE: 25  START TIME:  9:00 AM  END TIME: 12:00 PM  FACILITATOR(S): Isabel Alexander, JEANIE, PAULAC  TOPIC: BEH Group Therapy  Number of patients attending the group:  3  Group Length:  3 Hours  Dimensions addressed: 1, 2, 3, 4, 5, and 6    Summary of Group / Topics Discussed:  Weekend Planning:   Clients completed a weekend planning worksheet and shared with the group what their weekend plans are. Engaged with clients about high risks situations they may be encountering of the weekend and various tools and ways they will cope to maintain continued sobriety. Clients identified what activities they will do each day, who their supporters are, and what skills they can use if they have a crisis.  Clients were taught how this process relates to the \"cope ahead\" DBT skill and can help reduce anxiety and stress.     Client Session Goals / Objectives:  Develop a weekend safety plan  Identify sober supports in the home environment  Identify ways they can stay busy and occupy time  to minimize the risk of relapse for over the weekend.    Attestation:   Dr. Kwok - Medical Director - Provides oversight and supervision of care.    Group Attendance:  Attended group session  Patient's response to the group topic/interactions:  cooperative with task    Patient appeared to be Actively participating, Attentive, and Engaged.        Client specific details:   Zeny reported continuing abstinence with some thoughts, urges, and cravings, noting \"I have some days that are harder than others\". Triggers identified: walked into house and had a craving, yesterday.  Coping skills used: Took dog for a walk.  Engaged with others and the material presented as evidenced by weekend planning to reduce risk of return to use: Plans to get car running right, flat tire, \"may attend an NA meeting\". We discussed " "the difference between a hope and a plan and writer encouraged Zeny to decide when she is going to go to a meeting, commit, and actually go, pointing out the \"may go\" aspirations often result in negative self talk that is harmful.   She processed issues coming up related to her move and noted that she started looking for a place, yesterday, and is trying not to put too much pressure on herself at any one time.  Identified feelings (optimistic, looking forward to future about move), and rated level of distress re: the weekend coming up (30), and stated an affirmation: I am here today.    Attestation:   Dr. Kwok - Medical Director - Provides oversight and supervision of care.    "

## 2025-05-05 ENCOUNTER — TELEPHONE (OUTPATIENT)
Dept: BEHAVIORAL HEALTH | Facility: CLINIC | Age: 50
End: 2025-05-05
Payer: COMMERCIAL

## 2025-05-05 ENCOUNTER — HOSPITAL ENCOUNTER (OUTPATIENT)
Dept: BEHAVIORAL HEALTH | Facility: CLINIC | Age: 50
Discharge: HOME | End: 2025-05-05
Attending: FAMILY MEDICINE
Payer: COMMERCIAL

## 2025-05-05 DIAGNOSIS — F15.20 METHAMPHETAMINE USE DISORDER, SEVERE (H): Primary | ICD-10-CM

## 2025-05-05 DIAGNOSIS — F43.10 PTSD (POST-TRAUMATIC STRESS DISORDER): ICD-10-CM

## 2025-05-05 DIAGNOSIS — F32.1 MAJOR DEPRESSIVE DISORDER, SINGLE EPISODE, MODERATE (H): ICD-10-CM

## 2025-05-05 PROCEDURE — H2035 A/D TX PROGRAM, PER HOUR: HCPCS | Mod: HQ

## 2025-05-05 NOTE — GROUP NOTE
Group Therapy Documentation    PATIENT'S NAME: Zeny Mantilla  MRN:   [id number]  :   1975  ACCT. NUMBER: [id number]  DATE OF SERVICE: 25  START TIME:  9:00 AM  END TIME: 12:00 PM  FACILITATOR(S): Isabel Alexander, JEANIE, DUY  TOPIC: BEH Group Therapy  Number of patients attending the group:  6  Group Length:  3 Hours  Dimensions addressed: 1, 2, 3, 4, 5, and 6    Summary of Group / Topics Discussed:  Stress Management:  Clients were provided handout with definition of stress, what causes stress, and common warning signs of stress. Clients participated in discussion regarding the purpose of stress and stress response and how too much or too little can be problematic for health, safety, and/or motivation. Clients circled each symptom on the warning sign list categorized by physical, behavioral, and psychological stress to build awareness of individualized stress responses. Clients shared their stress experience and identified ways of reducing stress to improve overall wellness and relaxation.     Group Objectives/Goals:  Client will gain understanding of stress and the positive and negative effects on the mind and body    Client will identify ways to detect stress warning signs for too much or not enough stress specific to him or her    Client will identify ways to modify stress to boost motivation or decrease tension    Attestation:   Dr. Kwok - Medical Director - Provides oversight and supervision of care.    Group Attendance:  Attended group session    Patient's response to the group topic/interactions:  discussed personal experience with topic    Patient appeared to be Actively participating, Attentive, and Engaged.        Monday Client specific details:       2025    10:00 AM   Suicide Ideation Check In   Since last session, how often have you had suicidal thoughts? No thoughts of suicide     Zeny reported continuing abstinence with some thoughts, urges and cravings. Identified trigger(s):  habits/patterns of use.  Reported use of coping/relapse prevention skills/practices utilized: started goodbye letter, remembered how her mom  an alcoholic. She was sober nine months.   Medical/dental appointments this/next week: dental   Medication adherence: missed this morning. Concerns/side effects: Wellbutrin upsets stomach and she has to lay back down.   We did behavioral tailoring to support improved medication adherence. Encouraged her to put med box near breakfast, which she eats daily (kitchen counter) Refills: okay. Encouraged her to reach out to prescriber via [employer] if this continues.  Identified feelings, reported current SUDS level *(30), and verbalized an affirmation: grateful for parking, happy.     1:1 support after group (non-billable 15 minutes) to how to re-connect with previous therapist. Marvin Guzman, [facility].    Attestation:   Dr. Kwok - Medical Director - Provides oversight and supervision of care.

## 2025-05-05 NOTE — TELEPHONE ENCOUNTER
----- Message from Isabel Alexander sent at 5/5/2025  9:27 AM CDT -----  Regarding: Scheduling Additional Phase 1 (DOMINGO missed sessions)  Scheduling Request    Patient Name:WILBER QUIÑONEZ [[id number]]  Location of programming: [[id number]]   Start Date: October / 02 / 2023  Group: [facility] GROUP on Monday, Wednesday, and Thursday 9:00 AM to 12:00 PM  Attending Provider (MD): RICKY Kwok  Number of visits to be scheduled: 10  Duration of Appointment in minutes: 180  Visit Type: in person    Additional notes: Continue Phase 1

## 2025-05-07 ENCOUNTER — HOSPITAL ENCOUNTER (OUTPATIENT)
Dept: BEHAVIORAL HEALTH | Facility: CLINIC | Age: 50
Discharge: HOME | End: 2025-05-07
Attending: FAMILY MEDICINE
Payer: COMMERCIAL

## 2025-05-07 DIAGNOSIS — F32.1 MAJOR DEPRESSIVE DISORDER, SINGLE EPISODE, MODERATE (H): ICD-10-CM

## 2025-05-07 DIAGNOSIS — F15.20 METHAMPHETAMINE USE DISORDER, SEVERE (H): Primary | ICD-10-CM

## 2025-05-07 DIAGNOSIS — F43.10 PTSD (POST-TRAUMATIC STRESS DISORDER): ICD-10-CM

## 2025-05-07 PROCEDURE — H2035 A/D TX PROGRAM, PER HOUR: HCPCS | Mod: HQ

## 2025-05-07 NOTE — GROUP NOTE
Group Therapy Documentation    PATIENT'S NAME: Zeny Mantilla  MRN:   [id number]  :   1975  ACCT. NUMBER: [id number]  DATE OF SERVICE: 25  START TIME:  9:00 AM  END TIME: 12:00 PM  FACILITATOR(S): Isabel Alexander, [facility], [facility]; Donny Harding [facility]  TOPIC: BEH Group Therapy  Number of patients attending the group:  6  Group Length:  3 Hours  Dimensions addressed: 1, 2, 3, 4, 5, and 6    Summary of Group / Topics Discussed:  Process Group: Facilitated a process group surrounding client's experiences with substance use, mental health, and recovery. Provided clients the space and structure to engage in the process and allow client's to engage with their peers. Provided client's with support and insight to and various skills to utilize to address their concerns and how to promote recovery. Provided clients space to process their various treatment assignments and reciveve feedback from their peers regarding their assignments.      Objective(s):   Process group allows clients who abuse substances and have mental health symptoms to share in their recovery with others.  Client's will have the opportunity to problem solve with peers and learn and implement valuable coping skills for relapse prevention and daily life issues.   Client will engage in a discussion to develop insight on their substance use, mental health, and recovery.  Client will have the opportunity to learn from their peers regarding their recovery and mental health journey.   Client will identify what they learned and gain insight into their use and mental health   Client will process their treatment assignments and process what they learned from the assignment.     Expected therapeutic outcomes:    Build recovery resilience against cravings, leading to resilience against relapse in early sobriety.   Continue to build motivation to be vulnerable, honest, and building trust.  Growth in developing optimal environment and in managing difficult  "emotions  Build recovery resilience against cravings, leading to resilience against relapse in early sobriety.     Attestation:   Dr. Kwok - Medical Director - Provides oversight and supervision of care.    Group Attendance:  Attended group session    Patient's response to the group topic/interactions:  cooperative with task, discussed personal experience with topic, expressed readiness to alter behaviors, expressed understanding of topic, gave appropriate feedback to peers, listened actively, offered helpful suggestions to peers, and requested more information about topic    Patient appeared to be Actively participating, Attentive, and Engaged.        Client specific details:        5/7/2025    10:00 AM   Suicide Ideation Check In   Since last session, how often have you had suicidal thoughts? No thoughts of suicide     Zeny reported continuing abstinence with some thoughts, urges, and cravings.   Identified trigger(s): Bedroom and using area.  Reported use of coping/relapse prevention skills/practices utilized: Moved paraphernalia, downstairs and out of her bedroom, re-arranged the bedroom so that (using corner\" is left uncomfortable. . \"Now, I just need to safely dispose of it. Started on good-bye letter\" stuff is downstairs corner is left uncomfortable.   Highest and lowest SUDS levels in past week:None, reported pain in both elbows. Needs to establish with PCP near home. Will refer within [facility] and will [facility] locations tomorrow.   Identified mental health symptoms that are causing affective distress: some anxiety. Reported concerns: work on housing  Mental health/psychiatry appointments this or last week:   Sleep is okay, a bit woken up. She reported \"Pain in left elbow\" and [place] [facility] [place] [facility] [place] [facility] ..  She expressed gratitude for sober today..   Identified feelings (optimistic), reported current SUDS level, and verbalized an affirmation.    "

## 2025-05-08 ENCOUNTER — DOCUMENTATION ONLY (OUTPATIENT)
Dept: BEHAVIORAL HEALTH | Facility: CLINIC | Age: 50
End: 2025-05-08
Payer: COMMERCIAL

## 2025-05-08 NOTE — PROGRESS NOTES
ABSENT NOTE:    Zeny FREDERICK Annalee was absent from group 5/8/2025. This absence was not excused. This writer attempted to contact patient via telephone and left a message offering any support needed. Patient is expected to be in group on Monday, 5/12/2025.     Isabel Alexander, [facility], [facility]  5/8/2025 , 2:28 PM

## 2025-05-12 ENCOUNTER — DOCUMENTATION ONLY (OUTPATIENT)
Dept: BEHAVIORAL HEALTH | Facility: CLINIC | Age: 50
End: 2025-05-12
Payer: COMMERCIAL

## 2025-05-13 NOTE — PROGRESS NOTES
ABSENT NOTE:    Zeny Mantilla was absent from group 5/12/2025 . This absence was not excused. This writer attempted to contact patient via telephone and left a message encouraging return to group. Patient is expected to be in group on 5/14/2025.     Isabel Alexander, [facility], [facility]  5/13/2025 , 1:30 PM

## 2025-05-14 ENCOUNTER — HOSPITAL ENCOUNTER (OUTPATIENT)
Dept: BEHAVIORAL HEALTH | Facility: CLINIC | Age: 50
Discharge: HOME | End: 2025-05-14
Attending: FAMILY MEDICINE
Payer: COMMERCIAL

## 2025-05-14 ENCOUNTER — DOCUMENTATION ONLY (OUTPATIENT)
Dept: BEHAVIORAL HEALTH | Facility: CLINIC | Age: 50
End: 2025-05-14
Payer: COMMERCIAL

## 2025-05-14 NOTE — PROGRESS NOTES
ABSENT NOTE:    Zeny Mantilla was absent from group 5/14/2025. This absence was not excused. This writer attempted to contact patient via telephone and LM that she will be discharged at end of week, unless she contacts this writer or attends group tomorrow. Patient is expected to be in group on 5/15/2025.     Isabel Alexander, [facility], [facility]  5/14/2025 , 1:13 PM

## 2025-05-14 NOTE — GROUP NOTE
Group Therapy Documentation    PATIENT'S NAME: Zeny Mantilla  MRN:   [id number]  :   1975  ACCT. NUMBER: [id number]  DATE OF SERVICE: 25  START TIME:  9:00 AM  END TIME: 12:00 PM  FACILITATOR(S): Isabel Alexander, JEANIE, DUY  TOPIC: BEH Group Therapy  Number of patients attending the group:  7  Group Length:  3 Hours  Dimensions addressed: 1, 3, 4, 5, and 6    Summary of Group / Topics Discussed:  Stress Management:  Clients were provided handout with definition of stress, what causes stress, and common warning signs of stress. Clients participated in discussion regarding the purpose of stress and stress response and how too much or too little can be problematic for health, safety, and/or motivation. Clients circled each symptom on the warning sign list categorized by physical, behavioral, and psychological stress to build awareness of individualized stress responses. Clients shared their stress experience and identified ways of reducing stress to improve overall wellness and relaxation.     Group Objectives/Goals:  Client will gain understanding of stress and the positive and negative effects on the mind and body  Client will identify ways to detect stress warning signs for too much or not enough stress specific to him or her  Client will identify ways to modify stress to boost motivation or decrease tension    Attestation:   Dr. Kwok - Medical Director - Provides oversight and supervision of care.       Group Attendance:  {Group Attendance:[id number]}    Patient's response to the group topic/interactions:  {OPBEHCLIENTRESPONSE:[id number]}    Patient appeared to be {Engagement:[id number]}.        Client specific details:  ***.

## 2025-05-15 ENCOUNTER — TELEPHONE (OUTPATIENT)
Dept: BEHAVIORAL HEALTH | Facility: CLINIC | Age: 50
End: 2025-05-15
Payer: COMMERCIAL

## 2025-05-15 ENCOUNTER — DOCUMENTATION ONLY (OUTPATIENT)
Dept: BEHAVIORAL HEALTH | Facility: CLINIC | Age: 50
End: 2025-05-15
Payer: COMMERCIAL

## 2025-05-15 NOTE — PROGRESS NOTES
Zeny called to say she had been arrested in the parking lot after group Thursday, 5/8/2025. She reported plans to attend court tomorrow at 9am and an appointment w/Dr. Larson at 2:30 pm (re-cert). She has an interview at [facility] on Monday, but expressed concerns about not getting meds there.  sent an e-mail with a link to [facility], which may be a better fit to meet her needs in a longer-term residential setting. Allisonr will discharge with a plan to enter a higher level of care on Friday.

## 2025-05-18 ENCOUNTER — DOCUMENTATION ONLY (OUTPATIENT)
Dept: BEHAVIORAL HEALTH | Facility: CLINIC | Age: 50
End: 2025-05-18
Payer: COMMERCIAL

## 2025-05-18 NOTE — PROGRESS NOTES
"COUNSELOR S DISCHARGE SUMMARY  Date: 2025  Program Name:  Adult Co-Occurring Intensive Outpatient [facility]    Client Name Zeny Mantilla Date of Birth 1975  MR#  [id number]    Referred by [facility] Probation, family, and self       Release copies to [facility]    Admit date 3/17/2025     Admission Diagnoses: 296.32 (F33.1) Major Depressive Disorder, Recurrent Episode, Moderate  309.81 (F43.10) Posttraumatic Stress Disorder with dissociative symptoms  304.30 (F12.20) Cannabis Use Disorder Severe     304.40 (F15.20) Stimulant Use Disorder, Severe, Amphetamine-type substance    Discharge Date  5/15/2023    Number of Hours Completed: 39      Date Last Attended: 2025   Discharge Status:  Administrative Discharge (arrested)    PROBLEMS PRESENTED AT ADMISSION:  (Include reasons & circumstances for admission)  Zeny Mantilla is a 50 year old year old female  \"it's time\" (probation, medical, family, self).    PROGRAM PARTICIPATION:  While at [facility] Intensive Outpatient [facility], Zeny Mantilla received the following services to address substance use and mental health disorders.  she participated in:  Group Therapy, Individual Therapy, and Addiction Medicine    PROGRESS:   Dimension 1 - Acute Intoxication/Withdrawal Potential:  Admission ASAM Risk Ratin Client can tolerate and cope with withdrawal discomfort. The client displays mild to moderate intoxication or signs and symptoms interfering with daily functioning but does not immediately endanger self or others. Client poses minimal risk of severe withdrawal.  Discharge ASAM Risk Ratin Client can tolerate and cope with withdrawal discomfort. The client displays mild to moderate intoxication or signs and symptoms interfering with daily functioning but does not immediately endanger self or others. Client poses minimal risk of severe withdrawal.    Treatment goal(s):  \"Got and Stay Sober\"  Progress toward goal(s):  Zeny" "reported no substance use while in this IOP. She shared and process experiencing significant difficulty with urges and cravings, talked openly and expressed her difficulty with abstinence.    Dimension 2 - Biomedical Conditions & Complications:  Admission ASAM Risk Ratin Client tolerates and salena with physical discomfort and is able to get the services that the client needs.  Discharge ASAM Risk Ratin Client tolerates and salena with physical discomfort and is able to get the services that the client needs.    Treatment goal(s):  \"Manage BP, investigate leg pain, and pursue treatment to lower cholesterol.\"   Progress toward goal(s):  Established PCP with Laverne Cruz for HTN at [facility] located on the first floor of this IOP. Zeny started taking blood pressure medication daily and planned to see a health care professional but was discharged before getting a referral.    Dimension 3 - Emotional/Behavioral Conditions & Complications:  Admission ASAM Risk Ratin Client has difficulty with impulse control and lacks coping skills. Client has thoughts of suicide or harm to others without means; however, the thoughts may interfere with participation in some treatment activities. Client has difficulty functioning in significant life areas. Client has moderate symptoms of emotional, behavioral, or cognitive problems. Client is able to participate in most treatment activities.  Discharge ASAM Risk Ratin Client has difficulty with impulse control and lacks coping skills. Client has thoughts of suicide or harm to others without means; however, the thoughts may interfere with participation in some treatment activities. Client has difficulty functioning in significant life areas. Client has moderate symptoms of emotional, behavioral, or cognitive problems. Client is able to participate in most treatment activities.    Treatment goal(s):   \"Treat PTSD, Depression\" \"Re-start therapy " "with MARK Mendoza\"   Progress toward goal(s):  Zeny was diagnosed with 296.32 (F33.1) Major Depressive Disorder, Recurrent Episode, Moderate and 309.81 (F43.10) Posttraumatic Stress Disorder with dissociative symptoms. She sought care with previous provider. She was discharged while trying to make contact with MARK Mendoza, with this programs therapist's support. She was working on impriving medication adherence, as well.    Dimension 4 - Treatment Acceptance/Resistance:  Admission ASAM Risk Ratin Client is motivated with active reinforcement, to explore treatment and strategies for change, but ambivalent about illness or need for change.  Discharge ASAM Risk Ratin Client is motivated with active reinforcement, to explore treatment and strategies for change, but ambivalent about illness or need for change.    Treatment goal(s):   \"Stay sober to be healtier and to live, not let kids or self down\".   Progress toward goal(s):  Zeny attended group sessions regularly. She participated and shared difficulties. She reported abstinence, working on getting rid of paraphernalia and writing a good-bye letter to methamphetamine when she discharged due to arrest.    Dimension 5 - Relapse/Continued Problem Potential:  Admission ASAM Risk Ratin No awareness of the negative impact of mental health problems or substance abuse. No coping skills to arrest mental health or addiction illnesses, or prevent relapse.  Discharge ASAM Risk Rating: 3 Client has poor recognition and understanding of relapse and recidivism issues and displays moderately high vulnerability for further substance use or mental health problems. Client has few coping skills and rarely applies coping skills.    Treatment goal(s):   \"Get and stay sober\"   Progress toward goal(s):  Zeny reported abstinence while in program, but reported struggling with this. She was arrested and discharged from this program with hopes to attend a higher level of " "care.    Dimension 6 - Recovery Environment: (family, recreation, legal, education, etc.)  Admission ASAM Risk Ratin Client is engaged in structured, meaningful activity, but peers, family, significant other, and living environment are unsupportive, or there is criminal justice involvement by the client or among the client's peers, significant others, or in the client's living environment.  Discharge ASAM Risk Ratin Client has (A) Chronically antagonistic significant other, living environment, family, peer group or long-term criminal justice involvement that is harmful to recovery or treatment progress, or (B) Client has an actively antagonistic significant other, family, work, or living environment with immediate threat to the client's safety and well-being.    Treatment goal(s): Get and stay sober  Progress toward goal(s):  Zeny reported someone will be released from halfway who may place her recovery at risk. She is planning to move to a new address by 2025 and is struggling with the major task of moving. She has struggled with \"triggers all over my house\" and noted that her adult children are supportive but most live out of state.    Client strengths identified during treatment were: Open, willing to discuss difficult feelings, willing to improve medication adherence, established care with PCP and was willing to re-start therapy with a trusted previous therapist, family support from adult children    Client needs identified during treatment were: Introduced to skills and practices to support developing insight and building distress tolerance skills to support abstinence and recovery. Introduction and practice with tools (e.g., SUDS scale, feelings chart, affirmations, CBT-3 Cs)  DBT - Urge Surfing) to tolerate urges and discomfort. Early introduction to PTSD and how it is developed and maintained through avoidance of situations, reminders, cues, memories, feelings, people, places, activities, time of " day, smells, sounds, etc.    Discharge Diagnoses:     296.32 (F33.1) Major Depressive Disorder, Recurrent Episode, Moderate  309.81 (F43.10) Posttraumatic Stress Disorder with dissociative symptoms  304.30 (F12.20) Cannabis Use Disorder Severe     304.40 (F15.20) Stimulant Use Disorder, Severe, Amphetamine-type substance    Discharge Medications:   Current Outpatient Medications   Medication Sig Dispense Refill    buPROPion (WELLBUTRIN XL) 150 MG 24 hr tablet Take 1 tablet (150 mg) by mouth every morning for 7 days, THEN 2 tablets (300 mg) every morning for 23 days. 53 tablet 0    clobetasol (TEMOVATE) 0.05 % external solution Apply topically daily as needed (itching).      clotrimazole (LOTRIMIN) 1 % external cream       ketoconazole (NIZORAL) 2 % external shampoo Apply topically daily as needed.      metoprolol succinate ER (TOPROL XL) 100 MG 24 hr tablet Take 1 tablet (100 mg) by mouth daily 30 tablet 0    metroNIDAZOLE (METROGEL) 0.75 % vaginal gel Place 1 Applicatorful vaginally daily.      naltrexone (DEPADE/REVIA) 50 MG tablet Take 1/2 tablet daily x 5 days then increase to 1 tablet daily. 30 tablet 0    polyethylene glycol (MIRALAX) 17 GM/Dose powder Take 17 g by mouth      ramelteon (ROZEREM) 8 MG tablet Take 1 tablet (8 mg) by mouth at bedtime. 15 tablet 0     No current facility-administered medications for this visit.     Discharge Plan and Recommendations:  Living arrangements at discharge: unknown (staying adult child 5/14/2025; court 5/15/2025 on new charges; reported hope to start IP programming for Co-Occurring Disorders).    Patient terminated services. She withdrew from the program after arrest and incarceration with hope of IP.     The following continued care recommendations have been made:  Participate in programming for Substance Use and Co-Occurring Disorders as soon as available; maintain excellent medication adherence (take meds as prescribed), Complete treatment for PTSD; follow  recommendations of medical professionals to treat Hypertension, leg pain and routine care. Sober housing after treatment. Attend NA.     Prognosis:    Guarded    Staff Signature:  .Isabel Alexander, [facility], [facility] on 5/20/2025 at 4:43 PM

## 2025-05-21 ENCOUNTER — TELEPHONE (OUTPATIENT)
Dept: BEHAVIORAL HEALTH | Facility: CLINIC | Age: 50
End: 2025-05-21
Payer: COMMERCIAL

## 2025-05-21 NOTE — TELEPHONE ENCOUNTER
----- Message from Isabel Alexander sent at 2025  7:53 PM CDT -----  Regarding: Discharge DAANES  Discharge DAANES:   Client discharged from [facility] Intensive Outpatient Program for Co-Occurring program on 2025.    Writer completed client's discharge DAANES on 2025 DAANES ID:  #[id number]. Will you please complete the financial tab?  Thank you,  Isabel    Discharge record for admission #[id number], has been updated  Search for:                       Client Initials:    bolsn  Admission Date From:    2025   Click to show calendar  Client :     Click to show calendar Admission Date To:    2025   Click to show calendar  Client Partial SSN:    YASH Provider ID:    [id number]    Admission Discharge Six Month Client Initials Client  Client SSN PMI Client Admission Date Admin Status Client Discharge Date Discharge Status Admission ID  Edit Edit  FLORINDAYOLY 1975 [id number] 3/17/2025 Complete 2025 InComplete [id number]